# Patient Record
Sex: MALE | Race: WHITE | NOT HISPANIC OR LATINO | Employment: FULL TIME | URBAN - METROPOLITAN AREA
[De-identification: names, ages, dates, MRNs, and addresses within clinical notes are randomized per-mention and may not be internally consistent; named-entity substitution may affect disease eponyms.]

---

## 2018-07-26 ENCOUNTER — HOSPITAL ENCOUNTER (EMERGENCY)
Facility: HOSPITAL | Age: 33
Discharge: HOME | End: 2018-07-26
Attending: EMERGENCY MEDICINE

## 2018-07-26 VITALS
DIASTOLIC BLOOD PRESSURE: 71 MMHG | SYSTOLIC BLOOD PRESSURE: 119 MMHG | OXYGEN SATURATION: 98 % | HEART RATE: 65 BPM | TEMPERATURE: 99 F | RESPIRATION RATE: 18 BRPM

## 2018-07-26 DIAGNOSIS — M51.26 LUMBAR HERNIATED DISC: ICD-10-CM

## 2018-07-26 DIAGNOSIS — S39.92XA INJURY OF BACK, INITIAL ENCOUNTER: Primary | ICD-10-CM

## 2018-07-26 PROCEDURE — 96372 THER/PROPH/DIAG INJ SC/IM: CPT

## 2018-07-26 PROCEDURE — 63600175 PHARM REV CODE 636 W HCPCS: Performed by: EMERGENCY MEDICINE

## 2018-07-26 PROCEDURE — 99283 EMERGENCY DEPT VISIT LOW MDM: CPT | Mod: 25

## 2018-07-26 RX ORDER — ACETAMINOPHEN AND CODEINE PHOSPHATE 300; 30 MG/1; MG/1
1 TABLET ORAL EVERY 12 HOURS PRN
Qty: 10 TABLET | Refills: 0 | Status: SHIPPED | OUTPATIENT
Start: 2018-07-26 | End: 2018-08-02

## 2018-07-26 RX ORDER — ONDANSETRON 2 MG/ML
4 INJECTION INTRAMUSCULAR; INTRAVENOUS
Status: COMPLETED | OUTPATIENT
Start: 2018-07-26 | End: 2018-07-26

## 2018-07-26 RX ORDER — DIPHENHYDRAMINE HYDROCHLORIDE 50 MG/ML
50 INJECTION INTRAMUSCULAR; INTRAVENOUS ONCE
Status: COMPLETED | OUTPATIENT
Start: 2018-07-26 | End: 2018-07-26

## 2018-07-26 RX ORDER — HYDROMORPHONE HYDROCHLORIDE 1 MG/ML
1 INJECTION, SOLUTION INTRAMUSCULAR; INTRAVENOUS; SUBCUTANEOUS
Status: COMPLETED | OUTPATIENT
Start: 2018-07-26 | End: 2018-07-26

## 2018-07-26 RX ADMIN — Medication 1 MG: at 04:07

## 2018-07-26 RX ADMIN — DIPHENHYDRAMINE HYDROCHLORIDE 50 MG: 50 INJECTION, SOLUTION INTRAMUSCULAR; INTRAVENOUS at 04:07

## 2018-07-26 RX ADMIN — ONDANSETRON 4 MG: 2 INJECTION, SOLUTION INTRAMUSCULAR; INTRAVENOUS at 04:07

## 2018-07-26 NOTE — DISCHARGE INSTRUCTIONS
Please make sure to see your Workers Compensation physician tomorrow for follow-up, please take tomorrow off from work after your injury, please discuss with your physician about physical therapy, occupational therapy, and pain management, we have made you referrals to family medicine and to neurosurgery, if you develop any worsening pain, loss of sensation to your lower extremities, or inability to control your urine or stools, return to the ER immediately for evaluation, please do not drink or operate any vehicles or machinery while on this pain medication

## 2018-07-26 NOTE — ED PROVIDER NOTES
Encounter Date: 7/26/2018    SCRIBE #1 NOTE: ISabina, am scribing for, and in the presence of, . I have scribed the entire note.       History     Chief Complaint   Patient presents with    Back Pain     lumbar back pain that radiates down RLE. pt reports thst he injured his back 1 week ago at [employer], had MRI on 7/24, and f/u with pcp today. PCP gave steroid and toradol shots, and RX for ibuprofen 800mg but pt denies relief.      HPI   This is a 33 y.o. male with a PMHx of HLD, and PSHx of appendectomy, who presents to the Emergency Department with his girlfriend with a cc of lumbar back pain x 1 week.     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 The patient states s/p injury he has moderate, dull, and aching pain to his lower back, constant, worse with movement, and improved with rest, with mild radiation down his RLE, but denies any tawnya UI or stool I, no loss of sensation, no weakness, and no decreased ability to ambulate.    Otherwise, no recent admissions, no hx of IVD use, no procedures, no hx malignancy, no recent hospitalizations, and f/u appointment scheduled with Works Comp MD in 6 days. Was not given Rx for pain, and further was not set-up with PT OT or pain management. MRI showed no signs of chord impingement.     7/24/2018 MRI IMPRESSIONS:    There is disc bulges and facet arthroplasty along the lumbar spine with a broad posterior disc herniation at L5-S1. These changes cause no spinal canal narrowing and no sinificant foraminal narrowing.    Minimal disc bulges along the lower thoracic spine causing no spinal canal narrowing and no foraminal narrowing.    Review of patient's allergies indicates:  No Known Allergies  Past Medical History:   Diagnosis Date     High cholesterol      Past Surgical History:   Procedure Laterality Date    APPENDECTOMY       No family history on file.  Social History   Substance Use Topics    Smoking status: Never Smoker    Smokeless tobacco: Not on file    Alcohol use No     Review of Systems   Review of Systems   Constitutional: Negative for fevers, chills, diaphoresis and fever.   HENT: Negative for congestion, drooling, ear pain, nosebleeds, sore throat and trouble swallowing.    Eyes: Negative for photophobia, pain and discharge.   Respiratory: Negative for chest tightness, shortness of breath and wheezing.    Cardiovascular: Negative for chest pain and palpitations.   Gastrointestinal: Negative for abdominal pain, diarrhea, nausea and vomiting.   Genitourinary: Negative for difficulty urinating, dysuria, flank pain, frequency and hematuria.   Musculoskeletal: See HPI.  Skin: Negative for rash.   Neurological: Negative for dizziness, seizures, syncope and headaches.   Psychiatric/Behavioral: Negative for behavioral problems.       Physical Exam     Initial Vitals [07/26/18 1520]   BP Pulse Resp Temp SpO2   125/76 80 18 97.2 °F (36.2 °C) 98 %      MAP       --         Physical Exam   Constitutional: Pt appears well-developed and well-nourished.    HENT: Mild to moderate distress secondary to back pain.  Head: Normocephalic and atraumatic.   Mouth/Throat: No oropharyngeal exudate.   Eyes: Conjunctivae and EOM are normal. Pupils are equal, round, and reactive to light. No scleral icterus.   Neck: Normal range of motion. Neck supple. No JVD present.   Cardiovascular: Normal rate, regular rhythm and normal heart sounds. Exam reveals no gallop and no friction rub.    No murmur heard.  Pulmonary/Chest: Breath sounds normal. No stridor. No respiratory distress. Pt has no wheezes. Pt has no rhonchi.   Abdominal: Soft. Bowel sounds are normal. Pt exhibits no distension and no mass. There is no tenderness. There is no rebound and no  guarding.   Musculoskeletal: Normal range of motion. Pt exhibits no edema or tenderness.   Lymphadenopathy:     Pt has no cervical adenopathy.   Neurological: Pt is alert and oriented to person, place, and time. Pt has normal strength and normal reflexes. Pt displays normal reflexes. No cranial nerve deficit or sensory deficit. Pt has no saddle anesthesia, normal DTRs, and NO signs of cauda equina  Skin: Skin is warm and dry. Capillary refill takes less than 2 seconds. No rash and no abscess noted. No erythema.         ED Course   Procedures  Labs Reviewed - No data to display       Imaging Results    None                               Clinical Impression:   The primary encounter diagnosis was Injury of back, initial encounter. A diagnosis of Lumbar herniated disc was also pertinent to this visit.       MD NOTE, 4:30pm: Pt stable nad vss, pt presents as noted, unfortunately there is a misconception of ER capabilities, I explained to the patient NSG can only be called for emergent conditions such as cauda equina or ICH, but I can make this referral which I have, further I will make a PCP referral as this was not done, we aggressively treated his pain here with IV narcotics and he is improved, his wife will drive him home, I will write a short course of Tylenol 3 for tonight, but advised he MUST see his MD @ [employer] for pain management as all stronger narcotics have side effects such as dependence and respiratory depression, pt stable on DC, will get off tomorrow from work to make his appointments, referrals as noted, Rx as noted, repeat neuro exam by me shows NO signs of cauda equina, 5/5 motor and sensory, normal DTRs, NO SADDLE ANESTHESIA, and pt agrees entirely with the POC, I also told them:    Please make sure to see your Workers Compensation physician tomorrow for follow-up, please take tomorrow off from work after your injury, please discuss with your physician about physical therapy, occupational  therapy, and pain management, we have made you referrals to family medicine and to neurosurgery, if you develop any worsening pain, loss of sensation to your lower extremities, or inability to control your urine or stools, return to the ER immediately for evaluation, please do not drink or operate any vehicles or machinery while on this pain medication                            Mickie Marcelo MD  07/26/18 6406

## 2018-07-26 NOTE — ED NOTES
Pt presents to the ED w/ c/o of lumbar back pain that radiates down bilateral legs. Pt reports that he injured back 1week ago while at work and had MRI on 7/24. Pt reports saw PCP and was given steroid shot and toradol and rx for ibuprofen 800mg without relief.

## 2020-03-16 ENCOUNTER — OFFICE VISIT (OUTPATIENT)
Dept: URGENT CARE | Facility: CLINIC | Age: 35
End: 2020-03-16
Payer: COMMERCIAL

## 2020-03-16 VITALS
TEMPERATURE: 98 F | BODY MASS INDEX: 32.58 KG/M2 | HEART RATE: 90 BPM | RESPIRATION RATE: 16 BRPM | HEIGHT: 69 IN | WEIGHT: 220 LBS | OXYGEN SATURATION: 97 % | SYSTOLIC BLOOD PRESSURE: 125 MMHG | DIASTOLIC BLOOD PRESSURE: 86 MMHG

## 2020-03-16 DIAGNOSIS — M94.0 COSTOCHONDRITIS: ICD-10-CM

## 2020-03-16 DIAGNOSIS — B34.9 VIRAL ILLNESS: Primary | ICD-10-CM

## 2020-03-16 LAB
CTP QC/QA: YES
FLUAV AG NPH QL: NEGATIVE
FLUBV AG NPH QL: NEGATIVE

## 2020-03-16 PROCEDURE — 87804 POCT INFLUENZA A/B: ICD-10-PCS | Mod: 59,QW,S$GLB, | Performed by: FAMILY MEDICINE

## 2020-03-16 PROCEDURE — 93010 EKG 12-LEAD: ICD-10-PCS | Mod: S$GLB,,, | Performed by: INTERNAL MEDICINE

## 2020-03-16 PROCEDURE — 93005 EKG 12-LEAD: ICD-10-PCS | Mod: S$GLB,,, | Performed by: FAMILY MEDICINE

## 2020-03-16 PROCEDURE — 87804 INFLUENZA ASSAY W/OPTIC: CPT | Mod: QW,S$GLB,, | Performed by: FAMILY MEDICINE

## 2020-03-16 PROCEDURE — 71046 XR CHEST PA AND LATERAL: ICD-10-PCS | Mod: S$GLB,,, | Performed by: RADIOLOGY

## 2020-03-16 PROCEDURE — 99214 OFFICE O/P EST MOD 30 MIN: CPT | Mod: 25,S$GLB,, | Performed by: FAMILY MEDICINE

## 2020-03-16 PROCEDURE — 93005 ELECTROCARDIOGRAM TRACING: CPT | Mod: S$GLB,,, | Performed by: FAMILY MEDICINE

## 2020-03-16 PROCEDURE — 99214 PR OFFICE/OUTPT VISIT, EST, LEVL IV, 30-39 MIN: ICD-10-PCS | Mod: 25,S$GLB,, | Performed by: FAMILY MEDICINE

## 2020-03-16 PROCEDURE — 71046 X-RAY EXAM CHEST 2 VIEWS: CPT | Mod: S$GLB,,, | Performed by: RADIOLOGY

## 2020-03-16 PROCEDURE — 93010 ELECTROCARDIOGRAM REPORT: CPT | Mod: S$GLB,,, | Performed by: INTERNAL MEDICINE

## 2020-03-16 RX ORDER — ALBUTEROL SULFATE 90 UG/1
1-2 AEROSOL, METERED RESPIRATORY (INHALATION)
Qty: 18 G | Refills: 0 | Status: SHIPPED | OUTPATIENT
Start: 2020-03-16 | End: 2021-08-02

## 2020-03-16 NOTE — LETTER
March 16, 2020      [facility] [address]  [facility] [address]  Phone: [phone]  Fax: [phone]       Patient: Micah Pena   YOB: 1985  Date of Visit: 03/16/2020    To Whom It May Concern:    Kral Pena  was at [facility] on 03/16/2020. He may return to work/school on 3/30/20 with no restrictions. If you have any questions or concerns, or if I can be of further assistance, please do not hesitate to contact me.    Sincerely,      Jose Dorsey NP

## 2020-03-16 NOTE — PATIENT INSTRUCTIONS
PLEASE READ YOUR DISCHARGE INSTRUCTIONS ENTIRELY AS IT CONTAINS IMPORTANT INFORMATION.    Please go home and quarantine yourself from 14 days when the symptoms started    Monitor for any worsening symptoms if anything severely worsening please go to the ER    Tylenol and ibuprofen as needed    Drink plenty of fluids rest    Wash hands frequently cover your cough and sneeze    Instructions for Home Care of Patients and Caretakers with Coronavirus Disease 2019     Limit visitors to the home.  Older persons and those that have chronic medical conditions such as diabetes, lung and heart disease are at increased risk for illness.    If possible, patients should use a separate bedroom while recovering. Caregivers and household members should avoid prolonged contact with the patient which means to stay 6 feet away and avoid contact with cough droplets.  When close contact is necessary, wash your hands before and immediately after contact.    Perform hand hygiene frequently. Wash your hands often with soap and water for at least 20 seconds or use an alcohol-based hand , covering all surfaces of your hands and rubbing them together until they feel dry.    Avoid touching your eyes, nose, and mouth with unwashed hands.   Avoid sharing household items with the patient. You should not share dishes, drinking glasses, cups, eating utensils, towels, bedding, or other items. After the patient uses these items, you should wash them thoroughly.   Wash laundry thoroughly.   o Immediately remove and wash clothes or bedding that have blood, stool, or body fluids on them.   Clean all high-touch surfaces, such as counters, tabletops, doorknobs, bathroom fixtures, toilets, phones, keyboards, tablets, and bedside tables, every day.   o Use a household cleaning spray or wipe, according to the label instructions. Labels contain instructions for safe and effective use of the cleaning product including precautions you should  "take when applying the product, such as wearing gloves and making sure you have good ventilation during use of the product.    For more information see CDC link below.      https://www.cdc.gov/coronavirus/2019-ncov/hcp/guidance-prevent-spread.html#precautions               If your symptoms worsen or if you have any other concerns, please contact [facility] at [phone].       You must understand that you have received an Urgent Care treatment only and that you may be released before all of your medical problems are known or treated.      Viral Syndrome (Adult)  A viral illness may cause a number of symptoms. The symptoms depend on the part of the body that the virus affects. If it settles in your nose, throat, and lungs, it may cause cough, sore throat, congestion, and sometimes headache. If it settles in your stomach and intestinal tract, it may cause vomiting and diarrhea. Sometimes it causes vague symptoms like "aching all over," feeling tired, loss of appetite, or fever.  A viral illness usually lasts 1 to 2 weeks, but sometimes it lasts longer. In some cases, a more serious infection can look like a viral syndrome in the first few days of the illness. You may need another exam and additional tests to know the difference. Watch for the warning signs listed below.  Home care  Follow these guidelines for taking care of yourself at home:  · If symptoms are severe, rest at home for the first 2 to 3 days.  · Stay away from cigarette smoke - both your smoke and the smoke from others.  · You may use over-the-counter acetaminophen or ibuprofen for fever, muscle aching, and headache, unless another medicine was prescribed for this. If you have chronic liver or kidney disease or ever had a stomach ulcer or GI bleeding, talk with your doctor before using these medicines. No one who is younger than 18 and ill with a fever should take aspirin. It may cause severe disease or death.  · Your appetite may be poor, so a " light diet is fine. Avoid dehydration by drinking 8 to 12 8-ounce glasses of fluids each day. This may include water; orange juice; lemonade; apple, grape, and cranberry juice; clear fruit drinks; electrolyte replacement and sports drinks; and decaffeinated teas and coffee. If you have been diagnosed with a kidney disease, ask your doctor how much and what types of fluids you should drink to prevent dehydration. If you have kidney disease, drinking too much fluid can cause it build up in the your body and be dangerous to your health.  · Over-the-counter remedies won't shorten the length of the illness but may be helpful for cough, sore throat; and nasal and sinus congestion. Don't use decongestants if you have high blood pressure.  Follow-up care  Follow up with your healthcare provider if you do not improve over the next week.  Call 911  Get emergency medical care if any of the following occur:  · Convulsion  · Feeling weak, dizzy, or like you are going to faint  · Chest pain, shortness of breath, wheezing, or difficulty breathing  When to seek medical advice  Call your healthcare provider right away if any of these occur:  · Cough with lots of colored sputum (mucus) or blood in your sputum  · Chest pain, shortness of breath, wheezing, or difficulty breathing  · Severe headache; face, neck, or ear pain  · Severe, constant pain in the lower right side of your belly (abdominal)  · Continued vomiting (cant keep liquids down)  · Frequent diarrhea (more than 5 times a day); blood (red or black color) or mucus in diarrhea  · Feeling weak, dizzy, or like you are going to faint  · Extreme thirst  · Fever of 100.4°F (38°C) or higher, or as directed by your healthcare provider  Date Last Reviewed: 9/25/2015  © 5714-1774 [employer] [address] [place] [address], [place] [address]. All rights reserved. This information is not intended as a substitute for professional medical care. Always follow your healthcare  professional's instructions.

## 2020-03-16 NOTE — PROGRESS NOTES
"Subjective:       Patient ID: Micah Pena Jr. is a 35 y.o. male.    Vitals:  height is 5' 9" (1.753 m) and weight is 99.8 kg (220 lb). His temperature is 97.9 °F (36.6 °C). His blood pressure is 125/86 and his pulse is 90. His respiration is 16 and oxygen saturation is 97%.     Chief Complaint: Chest Pain and Shortness of Breath    Pt. States he is having midsternal chest pain (tender to palpation) and shortness of breath. Pt. States this started this morning. Pt. Denies fever, chills, body aches, and sore throat. Pt. States he's had a dry cough for the past 3 days. No recent travel -he did have contact with someone on a cruise two weeks ago- no contact with anyone positive for covid 19.       Chest Pain    This is a new problem. The current episode started today. The onset quality is sudden. The problem occurs 2 to 4 times per day. The problem has been unchanged. The pain is present in the substernal region. The pain is at a severity of 8/10. The pain is mild. The quality of the pain is described as pressure, squeezing and tightness. The pain does not radiate. Associated symptoms include a cough and shortness of breath. Pertinent negatives include no abdominal pain, back pain, dizziness, fever, headaches, malaise/fatigue, nausea, near-syncope, palpitations, syncope or vomiting. The cough has no precipitants. The cough is non-productive. Nothing worsens the cough. The pain is aggravated by nothing. He has tried nothing for the symptoms. The treatment provided no relief. Risk factors include male gender.   His past medical history is significant for diabetes, hyperlipidemia and sleep apnea.   Pertinent negatives for past medical history include no cancer, no MI and no recent injury.   His family medical history is significant for heart disease, hypertension and stroke.   Pertinent negatives for family medical history include: no CAD.       Constitution: Negative for chills, fatigue and fever.   HENT: Negative for " trouble swallowing.    Neck: Negative for neck pain.   Cardiovascular: Positive for chest pain. Negative for chest trauma, leg swelling, palpitations and passing out.   Respiratory: Positive for cough and shortness of breath. Negative for sleep apnea.    Gastrointestinal: Negative for abdominal pain, nausea, vomiting and heartburn.   Musculoskeletal: Negative for back pain and pain with walking.   Skin: Negative for rash.   Neurological: Negative for dizziness, light-headedness, passing out and headaches.   Hematologic/Lymphatic: Negative for history of blood clots.   Psychiatric/Behavioral: Negative for nervous/anxious. The patient is not nervous/anxious.        Objective:      Physical Exam   Constitutional: He is oriented to person, place, and time. He appears well-developed and well-nourished. He is cooperative.  Non-toxic appearance. He does not have a sickly appearance. He does not appear ill. No distress.   HENT:   Head: Normocephalic and atraumatic.   Right Ear: Hearing, tympanic membrane, external ear and ear canal normal. No middle ear effusion.   Left Ear: Hearing, tympanic membrane, external ear and ear canal normal.  No middle ear effusion.   Nose: Nose normal. No mucosal edema, rhinorrhea or nasal deformity. No epistaxis. Right sinus exhibits no maxillary sinus tenderness and no frontal sinus tenderness. Left sinus exhibits no maxillary sinus tenderness and no frontal sinus tenderness.   Mouth/Throat: Uvula is midline, oropharynx is clear and moist and mucous membranes are normal. No trismus in the jaw. Normal dentition. No uvula swelling. No oropharyngeal exudate, posterior oropharyngeal edema or posterior oropharyngeal erythema.   Pts Mask was not removed to decrease any transmission of viruses, he does not have a sore throat no muffled voice  No sore throat   Eyes: Conjunctivae and lids are normal. No scleral icterus.   Neck: Trachea normal, full passive range of motion without pain and phonation  normal. Neck supple. No neck rigidity. No edema and no erythema present.   Cardiovascular: Normal rate, regular rhythm, normal heart sounds, intact distal pulses and normal pulses.   Pulmonary/Chest: Effort normal and breath sounds normal. No accessory muscle usage. No tachypnea. No respiratory distress. He has no decreased breath sounds. He has no wheezes. He has no rhonchi. He has no rales. He exhibits tenderness (Midsternal).   No acute distress able speaking clearly complete sentences   Abdominal: Normal appearance.   Musculoskeletal: Normal range of motion. He exhibits no edema or deformity.   Neurological: He is alert and oriented to person, place, and time. He exhibits normal muscle tone. Coordination normal.   Skin: Skin is warm, dry, intact, not diaphoretic and not pale.   Psychiatric: He has a normal mood and affect. His speech is normal and behavior is normal. Judgment and thought content normal. Cognition and memory are normal.   Nursing note and vitals reviewed.        Results for orders placed or performed in visit on 03/16/20   POCT Influenza A/B   Result Value Ref Range    Rapid Influenza A Ag Negative Negative    Rapid Influenza B Ag Negative Negative     Acceptable Yes      X-ray Chest Pa And Lateral    Result Date: 3/16/2020  EXAMINATION: XR CHEST PA AND LATERAL CLINICAL HISTORY: Cough TECHNIQUE: PA and lateral views of the chest were performed. COMPARISON: Chest radiograph 02/08/2018 FINDINGS: No detrimental change.The lungs are clear, with normal appearance of pulmonary vasculature and no pleural effusion or pneumothorax. The cardiac silhouette is normal in size. The hilar and mediastinal contours are unremarkable. Bones are intact.     No detrimental change or radiographic acute intrathoracic process seen.  Specifically, no focal consolidation. Electronically signed by: Brijesh Moscoso MD Date:    03/16/2020 Time:    17:32  EKG: normal EKG, normal sinus rhythm, unchanged from  previous tracings. No ectopy. No ST segment elevation or depression. Rate of 69 bpm. T wave inversion III only. Present on ekg from 2018    Assessment:       1. Viral illness    2. Costochondritis        Plan:         Viral illness  -     POCT Influenza A/B  -     X-Ray Chest PA And Lateral; Future; Expected date: 03/16/2020  -     albuterol (PROVENTIL/VENTOLIN HFA) 90 mcg/actuation inhaler; Inhale 1-2 puffs into the lungs every 4 to 6 hours as needed for Wheezing. Rescue  Dispense: 18 g; Refill: 0    Costochondritis  -     EKG 12-lead     Discussed patient does not meet criteria for covid19 testing however this is the likely diagnosis is needs to go home in quarantine for 14 days go to the ER for any severely worsening symptoms patient agreeable.  Declined cough medicine    Patient Instructions   PLEASE READ YOUR DISCHARGE INSTRUCTIONS ENTIRELY AS IT CONTAINS IMPORTANT INFORMATION.    Please go home and quarantine yourself from 14 days when the symptoms started    Monitor for any worsening symptoms if anything severely worsening please go to the ER    Tylenol and ibuprofen as needed    Drink plenty of fluids rest    Wash hands frequently cover your cough and sneeze    Instructions for Home Care of Patients and Caretakers with Coronavirus Disease 2019     Limit visitors to the home.  Older persons and those that have chronic medical conditions such as diabetes, lung and heart disease are at increased risk for illness.    If possible, patients should use a separate bedroom while recovering. Caregivers and household members should avoid prolonged contact with the patient which means to stay 6 feet away and avoid contact with cough droplets.  When close contact is necessary, wash your hands before and immediately after contact.    Perform hand hygiene frequently. Wash your hands often with soap and water for at least 20 seconds or use an alcohol-based hand , covering all surfaces of your hands and rubbing  "them together until they feel dry.    Avoid touching your eyes, nose, and mouth with unwashed hands.   Avoid sharing household items with the patient. You should not share dishes, drinking glasses, cups, eating utensils, towels, bedding, or other items. After the patient uses these items, you should wash them thoroughly.   Wash laundry thoroughly.   o Immediately remove and wash clothes or bedding that have blood, stool, or body fluids on them.   Clean all high-touch surfaces, such as counters, tabletops, doorknobs, bathroom fixtures, toilets, phones, keyboards, tablets, and bedside tables, every day.   o Use a household cleaning spray or wipe, according to the label instructions. Labels contain instructions for safe and effective use of the cleaning product including precautions you should take when applying the product, such as wearing gloves and making sure you have good ventilation during use of the product.    For more information see CDC link below.      https://www.cdc.gov/coronavirus/2019-ncov/hcp/guidance-prevent-spread.html#precautions               If your symptoms worsen or if you have any other concerns, please contact [facility] at [phone].       You must understand that you have received an Urgent Care treatment only and that you may be released before all of your medical problems are known or treated.      Viral Syndrome (Adult)  A viral illness may cause a number of symptoms. The symptoms depend on the part of the body that the virus affects. If it settles in your nose, throat, and lungs, it may cause cough, sore throat, congestion, and sometimes headache. If it settles in your stomach and intestinal tract, it may cause vomiting and diarrhea. Sometimes it causes vague symptoms like "aching all over," feeling tired, loss of appetite, or fever.  A viral illness usually lasts 1 to 2 weeks, but sometimes it lasts longer. In some cases, a more serious infection can look like a viral " syndrome in the first few days of the illness. You may need another exam and additional tests to know the difference. Watch for the warning signs listed below.  Home care  Follow these guidelines for taking care of yourself at home:  · If symptoms are severe, rest at home for the first 2 to 3 days.  · Stay away from cigarette smoke - both your smoke and the smoke from others.  · You may use over-the-counter acetaminophen or ibuprofen for fever, muscle aching, and headache, unless another medicine was prescribed for this. If you have chronic liver or kidney disease or ever had a stomach ulcer or GI bleeding, talk with your doctor before using these medicines. No one who is younger than 18 and ill with a fever should take aspirin. It may cause severe disease or death.  · Your appetite may be poor, so a light diet is fine. Avoid dehydration by drinking 8 to 12 8-ounce glasses of fluids each day. This may include water; orange juice; lemonade; apple, grape, and cranberry juice; clear fruit drinks; electrolyte replacement and sports drinks; and decaffeinated teas and coffee. If you have been diagnosed with a kidney disease, ask your doctor how much and what types of fluids you should drink to prevent dehydration. If you have kidney disease, drinking too much fluid can cause it build up in the your body and be dangerous to your health.  · Over-the-counter remedies won't shorten the length of the illness but may be helpful for cough, sore throat; and nasal and sinus congestion. Don't use decongestants if you have high blood pressure.  Follow-up care  Follow up with your healthcare provider if you do not improve over the next week.  Call 911  Get emergency medical care if any of the following occur:  · Convulsion  · Feeling weak, dizzy, or like you are going to faint  · Chest pain, shortness of breath, wheezing, or difficulty breathing  When to seek medical advice  Call your healthcare provider right away if any of these  occur:  · Cough with lots of colored sputum (mucus) or blood in your sputum  · Chest pain, shortness of breath, wheezing, or difficulty breathing  · Severe headache; face, neck, or ear pain  · Severe, constant pain in the lower right side of your belly (abdominal)  · Continued vomiting (cant keep liquids down)  · Frequent diarrhea (more than 5 times a day); blood (red or black color) or mucus in diarrhea  · Feeling weak, dizzy, or like you are going to faint  · Extreme thirst  · Fever of 100.4°F (38°C) or higher, or as directed by your healthcare provider  Date Last Reviewed: 9/25/2015  © 7179-9328 [employer]. [address] [place] [address], [place] [address]. All rights reserved. This information is not intended as a substitute for professional medical care. Always follow your healthcare professional's instructions.

## 2021-04-26 ENCOUNTER — PATIENT MESSAGE (OUTPATIENT)
Dept: RESEARCH | Facility: HOSPITAL | Age: 36
End: 2021-04-26

## 2023-06-19 ENCOUNTER — TELEPHONE (OUTPATIENT)
Dept: ORTHOPEDICS | Facility: CLINIC | Age: 38
End: 2023-06-19

## 2023-06-19 ENCOUNTER — OFFICE VISIT (OUTPATIENT)
Dept: ORTHOPEDICS | Facility: CLINIC | Age: 38
End: 2023-06-19
Payer: MEDICAID

## 2023-06-19 VITALS
HEIGHT: 68 IN | DIASTOLIC BLOOD PRESSURE: 82 MMHG | BODY MASS INDEX: 38.21 KG/M2 | SYSTOLIC BLOOD PRESSURE: 122 MMHG | HEART RATE: 86 BPM | WEIGHT: 252.13 LBS

## 2023-06-19 DIAGNOSIS — S83.104A ACUTE TRAUMATIC INTERNAL DERANGEMENT OF RIGHT KNEE, INITIAL ENCOUNTER: ICD-10-CM

## 2023-06-19 PROCEDURE — 3008F PR BODY MASS INDEX (BMI) DOCUMENTED: ICD-10-PCS | Mod: CPTII,,, | Performed by: ORTHOPAEDIC SURGERY

## 2023-06-19 PROCEDURE — 99204 OFFICE O/P NEW MOD 45 MIN: CPT | Mod: S$PBB,,, | Performed by: ORTHOPAEDIC SURGERY

## 2023-06-19 PROCEDURE — 3079F PR MOST RECENT DIASTOLIC BLOOD PRESSURE 80-89 MM HG: ICD-10-PCS | Mod: CPTII,,, | Performed by: ORTHOPAEDIC SURGERY

## 2023-06-19 PROCEDURE — 1159F MED LIST DOCD IN RCRD: CPT | Mod: CPTII,,, | Performed by: ORTHOPAEDIC SURGERY

## 2023-06-19 PROCEDURE — 99999 PR PBB SHADOW E&M-EST. PATIENT-LVL III: CPT | Mod: PBBFAC,,, | Performed by: ORTHOPAEDIC SURGERY

## 2023-06-19 PROCEDURE — 1159F PR MEDICATION LIST DOCUMENTED IN MEDICAL RECORD: ICD-10-PCS | Mod: CPTII,,, | Performed by: ORTHOPAEDIC SURGERY

## 2023-06-19 PROCEDURE — 3079F DIAST BP 80-89 MM HG: CPT | Mod: CPTII,,, | Performed by: ORTHOPAEDIC SURGERY

## 2023-06-19 PROCEDURE — 3008F BODY MASS INDEX DOCD: CPT | Mod: CPTII,,, | Performed by: ORTHOPAEDIC SURGERY

## 2023-06-19 PROCEDURE — 3074F SYST BP LT 130 MM HG: CPT | Mod: CPTII,,, | Performed by: ORTHOPAEDIC SURGERY

## 2023-06-19 PROCEDURE — 99213 OFFICE O/P EST LOW 20 MIN: CPT | Mod: PBBFAC,PN | Performed by: ORTHOPAEDIC SURGERY

## 2023-06-19 PROCEDURE — 3074F PR MOST RECENT SYSTOLIC BLOOD PRESSURE < 130 MM HG: ICD-10-PCS | Mod: CPTII,,, | Performed by: ORTHOPAEDIC SURGERY

## 2023-06-19 PROCEDURE — 99999 PR PBB SHADOW E&M-EST. PATIENT-LVL III: ICD-10-PCS | Mod: PBBFAC,,, | Performed by: ORTHOPAEDIC SURGERY

## 2023-06-19 PROCEDURE — 99204 PR OFFICE/OUTPT VISIT, NEW, LEVL IV, 45-59 MIN: ICD-10-PCS | Mod: S$PBB,,, | Performed by: ORTHOPAEDIC SURGERY

## 2023-06-19 RX ORDER — HYDROXYZINE HYDROCHLORIDE 50 MG/1
50 TABLET, FILM COATED ORAL EVERY 6 HOURS PRN
Status: ON HOLD | COMMUNITY
Start: 2023-03-28 | End: 2023-07-10 | Stop reason: CLARIF

## 2023-06-19 RX ORDER — PROPRANOLOL HYDROCHLORIDE 20 MG/1
20 TABLET ORAL 2 TIMES DAILY
Status: ON HOLD | COMMUNITY
Start: 2023-04-26 | End: 2023-07-10 | Stop reason: CLARIF

## 2023-06-19 RX ORDER — VENLAFAXINE HYDROCHLORIDE 37.5 MG/1
37.5 CAPSULE, EXTENDED RELEASE ORAL
COMMUNITY
Start: 2023-04-26 | End: 2023-07-25

## 2023-06-19 RX ORDER — TRAMADOL HYDROCHLORIDE 50 MG/1
50 TABLET ORAL EVERY 6 HOURS
Qty: 25 TABLET | Refills: 0 | Status: ON HOLD | OUTPATIENT
Start: 2023-06-19 | End: 2023-07-10 | Stop reason: CLARIF

## 2023-06-19 RX ORDER — ARIPIPRAZOLE 15 MG/1
15 TABLET ORAL NIGHTLY
Status: ON HOLD | COMMUNITY
Start: 2023-04-26 | End: 2023-07-10 | Stop reason: CLARIF

## 2023-06-19 NOTE — TELEPHONE ENCOUNTER
Spoke with Miladys from [facility]. Pt Dx for tramadol is knee pain. Per Dr Hoover notes.

## 2023-06-19 NOTE — TELEPHONE ENCOUNTER
----- Message from Phuong Rothman LPN sent at 6/19/2023  1:18 PM CDT -----  Regarding: FW: diagnose code request  Contact: Martita [phone]    ----- Message -----  From: Manish Parrish  Sent: 6/19/2023   1:17 PM CDT  To: Kiko Bojorquez Staff  Subject: diagnose code request                            Martita from [facility] requesting RE: she needs a diagnose code for rx below       traMADoL (ULTRAM) 50 mg tablet    [facility] [address] [place], [place] [address] - [address]  [address] [place] [address]  Phone: [phone] Fax: [phone]

## 2023-06-19 NOTE — TELEPHONE ENCOUNTER
----- Message from Jarad Erickson sent at 6/19/2023 11:04 AM CDT -----  Consult/Advisory    Name Of Caller:Micah       Contact Preference:[phone]    Nature of call: Pt called regarding a note that he needs to bring to his job stating light duty and when he can return. Please call pt to further assist

## 2023-06-19 NOTE — TELEPHONE ENCOUNTER
Spoke with pt. Pt states he needs a letter for light duty. Letter written. All questions answered. Pt verbalized understanding.

## 2023-06-19 NOTE — PROGRESS NOTES
Patient ID: Micah Pena Jr. is a 38 y.o. male    Chief Complaint:   Chief Complaint   Patient presents with    Right Knee - Pain       History of Present Illness:    Pleasant 38-year-old male here for evaluation of right knee pain.  He states a few days ago he had a slip and fall at his house.  He felt a pop in his right knee.  He had immediate pain and difficulty weight-bearing.  Was seen at an emergency department and placed a knee immobilizer.  He is ambulating currently but does have subjective instability and mechanical symptoms.  He has a large joint effusion.  No prior knee pain.  Works in the oil industry.    PAST MEDICAL HISTORY:   Past Medical History:   Diagnosis Date    High cholesterol     Lumbar herniated disc      PAST SURGICAL HISTORY:   Past Surgical History:   Procedure Laterality Date    APPENDECTOMY       FAMILY HISTORY:   Family History   Problem Relation Age of Onset    Stroke Father      SOCIAL HISTORY:   Social History     Occupational History    Occupation: [employer]   Tobacco Use    Smoking status: Never    Smokeless tobacco: Never   Substance and Sexual Activity    Alcohol use: No    Drug use: Never    Sexual activity: Yes     Partners: Female     Birth control/protection: None        MEDICATIONS:   Current Outpatient Medications:     ARIPiprazole (ABILIFY) 15 MG Tab, Take 15 mg by mouth every evening., Disp: , Rfl:     hydrOXYzine (ATARAX) 50 MG tablet, Take 50 mg by mouth every 6 (six) hours as needed., Disp: , Rfl:     ibuprofen (ADVIL,MOTRIN) 800 MG tablet, Take 1 tablet (800 mg total) by mouth 3 (three) times daily. for 10 days, Disp: 30 tablet, Rfl: 0    propranoloL (INDERAL) 20 MG tablet, Take 20 mg by mouth 2 (two) times daily., Disp: , Rfl:     traMADoL (ULTRAM) 50 mg tablet, Take 1 tablet (50 mg total) by mouth every 6 (six) hours as needed for Pain. The medication you have been prescribed may cause drowsiness and impair your judgement.  Therefore, you should avoid  driving, climbing, using machinery, etc., so as not to increase your risk of injury.  Do NOT drink any alcohol while on this medication(s). It is also addictive., Disp: 10 tablet, Rfl: 0    venlafaxine (EFFEXOR-XR) 37.5 MG 24 hr capsule, Take 37.5 mg by mouth., Disp: , Rfl:     atorvastatin (LIPITOR) 40 MG tablet, atorvastatin 40 mg tablet  TAKE 1 TABLET BY MOUTH ONCE DAILY FOR 90 DAYS, Disp: , Rfl:     metFORMIN (GLUCOPHAGE-XR) 500 MG ER 24hr tablet, Take 500 mg by mouth once daily., Disp: , Rfl:     promethazine-dextromethorphan (PROMETHAZINE-DM) 6.25-15 mg/5 mL Syrp, Take by mouth., Disp: , Rfl:     traZODone (DESYREL) 50 MG tablet, Take 1 tablet (50 mg total) by mouth nightly., Disp: 30 tablet, Rfl: 0  ALLERGIES: Review of patient's allergies indicates:  No Known Allergies      Physical Exam     Vitals:    06/19/23 0851   BP: 122/82   Pulse: 86     Alert and oriented to person, place and time. No acute distress. Well-groomed, not ill appearing. Pupils round and reactive, normal respiratory effort, no audible wheezing.     GENERAL:  A well-developed, well-nourished 38 y.o. male who is alert and       oriented in no acute distress.      Gait: He  walks with a antalgic gait.                   EXTREMITIES:  Examination of lower extremities reveals there is no visible mass or deformity.    Right knee:  ROM     Ligamentously stable to varus/valgus stress.    Anterior and posterior drawers negative.    No pain over pes bursa.    No warmth    No erythema    Effusion Yes    medial, lateral joint line tenderness    Negative Patellofemoral grind/crepitus     Positive medial and lateral Mario    The skin over both lower extremities is normal and unremarkable.  He has a  painless range of motion of the hips and ankles bilaterally.   Sensation is intact in both lower extremities.    There are no motor deficits in the lower extremities bilaterally.   Pedal pulses are palpable distally bilaterally.    He has no calf  tenderness to palpation nor edema.       Imaging:       X-Ray: I have reviewed all pertinent results/findings and my personal findings are:  Normal x-ray.  No evidence of fracture or dislocation.  Joint effusion.      Assessment & Plan    Acute traumatic internal derangement of right knee, initial encounter  -     Ambulatory referral/consult to Orthopedics         Treatment options were discussed with the patient in detail. We discussed the patient's current imaging as well as differential diagnosis in detail. Based on the patient's symptoms of mechanical symptoms, instability , and traumatic injury, I do believe an MRI of the Right Knee is indicated to rule out meniscus and/or ACL tear, chondral injury and damage to intra-articular structures    The patient will follow-up after MRI to discuss results and further treatment options. They will call the clinic with any questions or concerns.     Follow up: for MRI/CT Results

## 2023-06-20 ENCOUNTER — PATIENT MESSAGE (OUTPATIENT)
Dept: ORTHOPEDICS | Facility: CLINIC | Age: 38
End: 2023-06-20
Payer: MEDICAID

## 2023-06-29 ENCOUNTER — OFFICE VISIT (OUTPATIENT)
Dept: ORTHOPEDICS | Facility: CLINIC | Age: 38
End: 2023-06-29
Payer: MEDICAID

## 2023-06-29 ENCOUNTER — PATIENT MESSAGE (OUTPATIENT)
Dept: ORTHOPEDICS | Facility: CLINIC | Age: 38
End: 2023-06-29

## 2023-06-29 VITALS
BODY MASS INDEX: 37.37 KG/M2 | WEIGHT: 246.56 LBS | DIASTOLIC BLOOD PRESSURE: 96 MMHG | HEART RATE: 90 BPM | SYSTOLIC BLOOD PRESSURE: 136 MMHG | HEIGHT: 68 IN

## 2023-06-29 DIAGNOSIS — S83.511D RUPTURE OF ANTERIOR CRUCIATE LIGAMENT OF RIGHT KNEE, SUBSEQUENT ENCOUNTER: Primary | ICD-10-CM

## 2023-06-29 DIAGNOSIS — Z01.818 PRE-OP EXAM: ICD-10-CM

## 2023-06-29 DIAGNOSIS — S83.104A ACUTE TRAUMATIC INTERNAL DERANGEMENT OF RIGHT KNEE, INITIAL ENCOUNTER: ICD-10-CM

## 2023-06-29 DIAGNOSIS — S83.231A COMPLEX TEAR OF MEDIAL MENISCUS OF RIGHT KNEE, UNSPECIFIED WHETHER OLD OR CURRENT TEAR, INITIAL ENCOUNTER: ICD-10-CM

## 2023-06-29 PROCEDURE — 1159F PR MEDICATION LIST DOCUMENTED IN MEDICAL RECORD: ICD-10-PCS | Mod: CPTII,,,

## 2023-06-29 PROCEDURE — 99214 OFFICE O/P EST MOD 30 MIN: CPT | Mod: S$PBB,,,

## 2023-06-29 PROCEDURE — 3080F DIAST BP >= 90 MM HG: CPT | Mod: CPTII,,,

## 2023-06-29 PROCEDURE — 3008F BODY MASS INDEX DOCD: CPT | Mod: CPTII,,,

## 2023-06-29 PROCEDURE — 99214 PR OFFICE/OUTPT VISIT, EST, LEVL IV, 30-39 MIN: ICD-10-PCS | Mod: S$PBB,,,

## 2023-06-29 PROCEDURE — 99215 OFFICE O/P EST HI 40 MIN: CPT | Mod: PBBFAC,PN

## 2023-06-29 PROCEDURE — 99999 PR PBB SHADOW E&M-EST. PATIENT-LVL V: ICD-10-PCS | Mod: PBBFAC,,,

## 2023-06-29 PROCEDURE — 1159F MED LIST DOCD IN RCRD: CPT | Mod: CPTII,,,

## 2023-06-29 PROCEDURE — 3075F SYST BP GE 130 - 139MM HG: CPT | Mod: CPTII,,,

## 2023-06-29 PROCEDURE — 3075F PR MOST RECENT SYSTOLIC BLOOD PRESS GE 130-139MM HG: ICD-10-PCS | Mod: CPTII,,,

## 2023-06-29 PROCEDURE — 3080F PR MOST RECENT DIASTOLIC BLOOD PRESSURE >= 90 MM HG: ICD-10-PCS | Mod: CPTII,,,

## 2023-06-29 PROCEDURE — 99999 PR PBB SHADOW E&M-EST. PATIENT-LVL V: CPT | Mod: PBBFAC,,,

## 2023-06-29 PROCEDURE — 3008F PR BODY MASS INDEX (BMI) DOCUMENTED: ICD-10-PCS | Mod: CPTII,,,

## 2023-06-29 RX ORDER — TRAMADOL HYDROCHLORIDE 50 MG/1
50 TABLET ORAL EVERY 6 HOURS PRN
Qty: 10 TABLET | Refills: 0 | Status: ON HOLD | OUTPATIENT
Start: 2023-06-29 | End: 2023-07-10 | Stop reason: CLARIF

## 2023-06-29 RX ORDER — METHOCARBAMOL 500 MG/1
500 TABLET, FILM COATED ORAL 4 TIMES DAILY
Qty: 40 TABLET | Refills: 0 | Status: SHIPPED | OUTPATIENT
Start: 2023-06-29 | End: 2023-07-10

## 2023-06-29 RX ORDER — DOXEPIN HYDROCHLORIDE 25 MG/1
25 CAPSULE ORAL NIGHTLY
Status: ON HOLD | COMMUNITY
Start: 2023-06-19 | End: 2023-07-10 | Stop reason: CLARIF

## 2023-06-29 RX ORDER — CEFAZOLIN SODIUM 2 G/50ML
2 SOLUTION INTRAVENOUS
Status: CANCELLED | OUTPATIENT
Start: 2023-06-29

## 2023-06-29 RX ORDER — QUETIAPINE FUMARATE 100 MG/1
200 TABLET, FILM COATED ORAL NIGHTLY
COMMUNITY
Start: 2023-06-23 | End: 2023-10-12

## 2023-06-29 NOTE — H&P
Patient ID: Micah Pena Jr. is a 38 y.o. male    Chief Complaint:   Chief Complaint   Patient presents with    Right Knee - Results, Pain       History of Present Illness:    Pleasant 38-year-old male here for evaluation of right knee pain.  He states a few days ago he had a slip and fall at his house.  He felt a pop in his right knee.  He had immediate pain and difficulty weight-bearing.  Was seen at an emergency department and placed a knee immobilizer.  He is ambulating currently but does have subjective instability and mechanical symptoms.  He has a large joint effusion.  No prior knee pain.  Works in the oil industry.    ____________________________________________________________________    Interval history 6/29/2023: Patient returns today for follow up of right knee pain. No changes since previous visit. He is here for MRI results.         PAST MEDICAL HISTORY:   Past Medical History:   Diagnosis Date    High cholesterol     Lumbar herniated disc      PAST SURGICAL HISTORY:   Past Surgical History:   Procedure Laterality Date    APPENDECTOMY       FAMILY HISTORY:   Family History   Problem Relation Age of Onset    Stroke Father      SOCIAL HISTORY:   Social History     Occupational History    Occupation: "[employer]" employee   Tobacco Use    Smoking status: Never    Smokeless tobacco: Never   Substance and Sexual Activity    Alcohol use: No    Drug use: Never    Sexual activity: Yes     Partners: Female     Birth control/protection: None        MEDICATIONS:   Current Outpatient Medications:     ARIPiprazole (ABILIFY) 15 MG Tab, Take 15 mg by mouth every evening., Disp: , Rfl:     doxepin (SINEQUAN) 25 MG capsule, Take 25 mg by mouth every evening., Disp: , Rfl:     hydrOXYzine (ATARAX) 50 MG tablet, Take 50 mg by mouth every 6 (six) hours as needed., Disp: , Rfl:     propranoloL (INDERAL) 20 MG tablet, Take 20 mg by mouth 2 (two) times daily., Disp: , Rfl:     QUEtiapine (SEROQUEL) 100 MG Tab, Take 100 mg by  mouth every evening., Disp: , Rfl:     traMADoL (ULTRAM) 50 mg tablet, Take 1 tablet (50 mg total) by mouth every 6 (six) hours., Disp: 25 tablet, Rfl: 0    venlafaxine (EFFEXOR-XR) 37.5 MG 24 hr capsule, Take 37.5 mg by mouth., Disp: , Rfl:     atorvastatin (LIPITOR) 40 MG tablet, atorvastatin 40 mg tablet  TAKE 1 TABLET BY MOUTH ONCE DAILY FOR 90 DAYS, Disp: , Rfl:     metFORMIN (GLUCOPHAGE-XR) 500 MG ER 24hr tablet, Take 500 mg by mouth once daily., Disp: , Rfl:     methocarbamoL (ROBAXIN) 500 MG Tab, Take 1 tablet (500 mg total) by mouth 4 (four) times daily. for 10 days, Disp: 40 tablet, Rfl: 0    promethazine-dextromethorphan (PROMETHAZINE-DM) 6.25-15 mg/5 mL Syrp, Take by mouth., Disp: , Rfl:     traMADoL (ULTRAM) 50 mg tablet, Take 1 tablet (50 mg total) by mouth every 6 (six) hours as needed for Pain. The medication you have been prescribed may cause drowsiness and impair your judgement.  Therefore, you should avoid driving, climbing, using machinery, etc., so as not to increase your risk of injury.  Do NOT drink any alcohol while on this medication(s). It is also addictive., Disp: 10 tablet, Rfl: 0    traZODone (DESYREL) 50 MG tablet, Take 1 tablet (50 mg total) by mouth nightly., Disp: 30 tablet, Rfl: 0  ALLERGIES: Review of patient's allergies indicates:  No Known Allergies      Physical Exam     Vitals:    06/29/23 1014   BP: (!) 136/96   Pulse: 90     Alert and oriented to person, place and time. No acute distress. Well-groomed, not ill appearing. Pupils round and reactive, normal respiratory effort, no audible wheezing.     GENERAL:  A well-developed, well-nourished 38 y.o. male who is alert and       oriented in no acute distress.      Gait: He  walks with a antalgic gait.                   EXTREMITIES:  Examination of lower extremities reveals there is no visible mass or deformity.    Right knee:  ROM     Ligamentously stable to varus/valgus stress.    Anterior and posterior drawers  negative.    No pain over pes bursa.    No warmth    No erythema    Effusion Yes    medial, lateral joint line tenderness    Negative Patellofemoral grind/crepitus     Positive medial and lateral Mario    The skin over both lower extremities is normal and unremarkable.  He has a  painless range of motion of the hips and ankles bilaterally.   Sensation is intact in both lower extremities.    There are no motor deficits in the lower extremities bilaterally.   Pedal pulses are palpable distally bilaterally.    He has no calf tenderness to palpation nor edema.       Imaging:       X-Ray: I have reviewed all pertinent results/findings and my personal findings are:  Normal x-ray.  No evidence of fracture or dislocation.  Joint effusion.    MRI right knee: complete ACL tear, complex posterior horn medial meniscus, high grade MCL tear, subchondral fracture of lateral femoral condyle, lateral tibial plateau bone marrow contusion.     Assessment & Plan    Rupture of anterior cruciate ligament of right knee, subsequent encounter    Pre-op exam  -     X-Ray Chest 1 View Pre-OP; Future; Expected date: 06/29/2023  -     CBC Auto Differential; Future; Expected date: 06/29/2023  -     EKG 12-lead; Future  -     Basic Metabolic Panel; Future; Expected date: 06/29/2023    Acute traumatic internal derangement of right knee, initial encounter  -     X-Ray Chest 1 View Pre-OP; Future; Expected date: 06/29/2023  -     CBC Auto Differential; Future; Expected date: 06/29/2023  -     EKG 12-lead; Future  -     Basic Metabolic Panel; Future; Expected date: 06/29/2023  -     traMADoL (ULTRAM) 50 mg tablet; Take 1 tablet (50 mg total) by mouth every 6 (six) hours as needed for Pain. The medication you have been prescribed may cause drowsiness and impair your judgement.  Therefore, you should avoid driving, climbing, using machinery, etc., so as not to increase your risk of injury.  Do NOT drink any alcohol while on this medication(s). It is  also addictive.  Dispense: 10 tablet; Refill: 0  -     methocarbamoL (ROBAXIN) 500 MG Tab; Take 1 tablet (500 mg total) by mouth 4 (four) times daily. for 10 days  Dispense: 40 tablet; Refill: 0    Complex tear of medial meniscus of right knee, unspecified whether old or current tear, initial encounter      Patient and I discussed MRI results today which show complete ACL tear, meniscus tear, subchondral fracture of lateral femoral condyle.  We discussed conservative treatment including physical therapy and bracing.  We discussed surgical options in detail including ACL reconstruction and possible meniscectomy versus meniscus repair.  Patient would like to proceed with surgery.  He understands the risks and benefits associated with surgery.    1. Imaging results reviewed today and discussed with Reno Hoover MD. We reviewed with Micah Pena Jr. today, the pathology and natural history of his diagnosis. We have discussed a variety of treatment options including medications, physical therapy and other alternative treatments. I also explained the indications, risks and benefits of surgery. After discussion, he decided to proceed with surgery. The decision was made to go forward with:  Right knee arthroscopy  Right knee ACL reconstruction with allograft  Right knee menisectomy vs mensicus repair  All other indicated procedures     The details of the surgical procedure were explained, including the location of probable incisions and a description of likely hardware and/or grafts to be used. The patient understands the likely convalescence after surgery. Also, we have thoroughly discussed the risks, benefits and alternatives to surgery, including, but not limited to, the risk of infection, joint stiffness, blood clot (including DVT and/or pulmonary embolus), neurologic and vascular injury.  It was explained that, if tissue has been repaired or reconstructed, there is a chance of failure, which may require further  management.    I made the decision to obtain old records of the patient including previous notes and imaging. I independently reviewed and interpreted lab results today as well as prior imaging.     Post-Op Medications to be prescribed:   Percocet 5/325mg Take 1-2 tablets every 4-6 hours PRN pain #28  Zofran 4mg oral disintegrating tablets every 8 hours PRN nausea/vomiting   Motrin 600 mg TID PRN   ASA 81mg twice daily x 2 weeks     Pt begin immediately post op  DME: Debra     Medical Clearance: No  Hx of DVT,PE, anesthetic complications: No     Additional notes/concerns:  None     Opioid Disclosure  Today we discussed the reasons why the prescription is necessary as well as: the risks of addiction and overdose associated with opioid drugs and the dangers of taking opioid drugs with alcohol, benzodiazepines and other central nervous system depressants; alternative treatments that may be available; the risks associated with the use of the drugs being prescribed, specifically that opioids are highly addictive, even when taken as prescribed, that there is a risk of developing a physical or psychological dependence on the controlled dangerous substance, and that the risks of taking more opioids than prescribed or mixing sedatives, benzodiazepines or alcohol with opioids can result in fatal respiratory depression.

## 2023-06-29 NOTE — PROGRESS NOTES
Patient ID: Micah Pena Jr. is a 38 y.o. male    Chief Complaint:   Chief Complaint   Patient presents with    Right Knee - Results, Pain       History of Present Illness:    Pleasant 38-year-old male here for evaluation of right knee pain.  He states a few days ago he had a slip and fall at his house.  He felt a pop in his right knee.  He had immediate pain and difficulty weight-bearing.  Was seen at an emergency department and placed a knee immobilizer.  He is ambulating currently but does have subjective instability and mechanical symptoms.  He has a large joint effusion.  No prior knee pain.  Works in the oil industry.    ____________________________________________________________________    Interval history 6/29/2023: Patient returns today for follow up of right knee pain. No changes since previous visit. He is here for MRI results.         PAST MEDICAL HISTORY:   Past Medical History:   Diagnosis Date    High cholesterol     Lumbar herniated disc      PAST SURGICAL HISTORY:   Past Surgical History:   Procedure Laterality Date    APPENDECTOMY       FAMILY HISTORY:   Family History   Problem Relation Age of Onset    Stroke Father      SOCIAL HISTORY:   Social History     Occupational History    Occupation: [employer] employee   Tobacco Use    Smoking status: Never    Smokeless tobacco: Never   Substance and Sexual Activity    Alcohol use: No    Drug use: Never    Sexual activity: Yes     Partners: Female     Birth control/protection: None        MEDICATIONS:   Current Outpatient Medications:     ARIPiprazole (ABILIFY) 15 MG Tab, Take 15 mg by mouth every evening., Disp: , Rfl:     doxepin (SINEQUAN) 25 MG capsule, Take 25 mg by mouth every evening., Disp: , Rfl:     hydrOXYzine (ATARAX) 50 MG tablet, Take 50 mg by mouth every 6 (six) hours as needed., Disp: , Rfl:     propranoloL (INDERAL) 20 MG tablet, Take 20 mg by mouth 2 (two) times daily., Disp: , Rfl:     QUEtiapine (SEROQUEL) 100 MG Tab, Take 100 mg by  mouth every evening., Disp: , Rfl:     traMADoL (ULTRAM) 50 mg tablet, Take 1 tablet (50 mg total) by mouth every 6 (six) hours., Disp: 25 tablet, Rfl: 0    venlafaxine (EFFEXOR-XR) 37.5 MG 24 hr capsule, Take 37.5 mg by mouth., Disp: , Rfl:     atorvastatin (LIPITOR) 40 MG tablet, atorvastatin 40 mg tablet  TAKE 1 TABLET BY MOUTH ONCE DAILY FOR 90 DAYS, Disp: , Rfl:     metFORMIN (GLUCOPHAGE-XR) 500 MG ER 24hr tablet, Take 500 mg by mouth once daily., Disp: , Rfl:     methocarbamoL (ROBAXIN) 500 MG Tab, Take 1 tablet (500 mg total) by mouth 4 (four) times daily. for 10 days, Disp: 40 tablet, Rfl: 0    promethazine-dextromethorphan (PROMETHAZINE-DM) 6.25-15 mg/5 mL Syrp, Take by mouth., Disp: , Rfl:     traMADoL (ULTRAM) 50 mg tablet, Take 1 tablet (50 mg total) by mouth every 6 (six) hours as needed for Pain. The medication you have been prescribed may cause drowsiness and impair your judgement.  Therefore, you should avoid driving, climbing, using machinery, etc., so as not to increase your risk of injury.  Do NOT drink any alcohol while on this medication(s). It is also addictive., Disp: 10 tablet, Rfl: 0    traZODone (DESYREL) 50 MG tablet, Take 1 tablet (50 mg total) by mouth nightly., Disp: 30 tablet, Rfl: 0  ALLERGIES: Review of patient's allergies indicates:  No Known Allergies      Physical Exam     Vitals:    06/29/23 1014   BP: (!) 136/96   Pulse: 90     Alert and oriented to person, place and time. No acute distress. Well-groomed, not ill appearing. Pupils round and reactive, normal respiratory effort, no audible wheezing.     GENERAL:  A well-developed, well-nourished 38 y.o. male who is alert and       oriented in no acute distress.      Gait: He  walks with a antalgic gait.                   EXTREMITIES:  Examination of lower extremities reveals there is no visible mass or deformity.    Right knee:  ROM     Ligamentously stable to varus/valgus stress.    Anterior and posterior drawers  negative.    No pain over pes bursa.    No warmth    No erythema    Effusion Yes    medial, lateral joint line tenderness    Negative Patellofemoral grind/crepitus     Positive medial and lateral Mario    The skin over both lower extremities is normal and unremarkable.  He has a  painless range of motion of the hips and ankles bilaterally.   Sensation is intact in both lower extremities.    There are no motor deficits in the lower extremities bilaterally.   Pedal pulses are palpable distally bilaterally.    He has no calf tenderness to palpation nor edema.       Imaging:       X-Ray: I have reviewed all pertinent results/findings and my personal findings are:  Normal x-ray.  No evidence of fracture or dislocation.  Joint effusion.    MRI right knee: complete ACL tear, complex posterior horn medial meniscus, high grade MCL tear, subchondral fracture of lateral femoral condyle, lateral tibial plateau bone marrow contusion.     Assessment & Plan    Rupture of anterior cruciate ligament of right knee, subsequent encounter    Pre-op exam  -     X-Ray Chest 1 View Pre-OP; Future; Expected date: 06/29/2023  -     CBC Auto Differential; Future; Expected date: 06/29/2023  -     EKG 12-lead; Future  -     Basic Metabolic Panel; Future; Expected date: 06/29/2023    Acute traumatic internal derangement of right knee, initial encounter  -     X-Ray Chest 1 View Pre-OP; Future; Expected date: 06/29/2023  -     CBC Auto Differential; Future; Expected date: 06/29/2023  -     EKG 12-lead; Future  -     Basic Metabolic Panel; Future; Expected date: 06/29/2023  -     traMADoL (ULTRAM) 50 mg tablet; Take 1 tablet (50 mg total) by mouth every 6 (six) hours as needed for Pain. The medication you have been prescribed may cause drowsiness and impair your judgement.  Therefore, you should avoid driving, climbing, using machinery, etc., so as not to increase your risk of injury.  Do NOT drink any alcohol while on this medication(s). It is  also addictive.  Dispense: 10 tablet; Refill: 0  -     methocarbamoL (ROBAXIN) 500 MG Tab; Take 1 tablet (500 mg total) by mouth 4 (four) times daily. for 10 days  Dispense: 40 tablet; Refill: 0    Complex tear of medial meniscus of right knee, unspecified whether old or current tear, initial encounter      Patient and I discussed MRI results today which show complete ACL tear, meniscus tear, subchondral fracture of lateral femoral condyle.  We discussed conservative treatment including physical therapy and bracing.  We discussed surgical options in detail including ACL reconstruction and possible meniscectomy versus meniscus repair.  Patient would like to proceed with surgery.  He understands the risks and benefits associated with surgery.    1. Imaging results reviewed today and discussed with Reno Hoover MD. We reviewed with Micah Pena Jr. today, the pathology and natural history of his diagnosis. We have discussed a variety of treatment options including medications, physical therapy and other alternative treatments. I also explained the indications, risks and benefits of surgery. After discussion, he decided to proceed with surgery. The decision was made to go forward with:  Right knee arthroscopy  Right knee ACL reconstruction with allograft  Right knee menisectomy vs mensicus repair  All other indicated procedures     The details of the surgical procedure were explained, including the location of probable incisions and a description of likely hardware and/or grafts to be used. The patient understands the likely convalescence after surgery. Also, we have thoroughly discussed the risks, benefits and alternatives to surgery, including, but not limited to, the risk of infection, joint stiffness, blood clot (including DVT and/or pulmonary embolus), neurologic and vascular injury.  It was explained that, if tissue has been repaired or reconstructed, there is a chance of failure, which may require further  management.    I made the decision to obtain old records of the patient including previous notes and imaging. I independently reviewed and interpreted lab results today as well as prior imaging.     Post-Op Medications to be prescribed:   Percocet 5/325mg Take 1-2 tablets every 4-6 hours PRN pain #28  Zofran 4mg oral disintegrating tablets every 8 hours PRN nausea/vomiting   Motrin 600 mg TID PRN   ASA 81mg twice daily x 2 weeks     Pt begin immediately post op  DME: Debra     Medical Clearance: No  Hx of DVT,PE, anesthetic complications: No     Additional notes/concerns:  None     Opioid Disclosure  Today we discussed the reasons why the prescription is necessary as well as: the risks of addiction and overdose associated with opioid drugs and the dangers of taking opioid drugs with alcohol, benzodiazepines and other central nervous system depressants; alternative treatments that may be available; the risks associated with the use of the drugs being prescribed, specifically that opioids are highly addictive, even when taken as prescribed, that there is a risk of developing a physical or psychological dependence on the controlled dangerous substance, and that the risks of taking more opioids than prescribed or mixing sedatives, benzodiazepines or alcohol with opioids can result in fatal respiratory depression.

## 2023-07-06 ENCOUNTER — TELEPHONE (OUTPATIENT)
Dept: ORTHOPEDICS | Facility: CLINIC | Age: 38
End: 2023-07-06
Payer: MEDICAID

## 2023-07-06 NOTE — TELEPHONE ENCOUNTER
Spoke with patient. Informed him 8/10 is available for OR date. Patient accepted and will obtain labs tomorrow.

## 2023-07-10 ENCOUNTER — PATIENT MESSAGE (OUTPATIENT)
Dept: ORTHOPEDICS | Facility: CLINIC | Age: 38
End: 2023-07-10
Payer: MEDICAID

## 2023-07-11 DIAGNOSIS — G89.18 POST-OPERATIVE PAIN: Primary | ICD-10-CM

## 2023-07-11 RX ORDER — OXYCODONE AND ACETAMINOPHEN 5; 325 MG/1; MG/1
1 TABLET ORAL EVERY 4 HOURS PRN
Qty: 28 EACH | Refills: 0 | Status: SHIPPED | OUTPATIENT
Start: 2023-07-11

## 2023-07-14 ENCOUNTER — TELEPHONE (OUTPATIENT)
Dept: ORTHOPEDICS | Facility: CLINIC | Age: 38
End: 2023-07-14
Payer: MEDICAID

## 2023-07-14 NOTE — TELEPHONE ENCOUNTER
----- Message from Sudha Squires sent at 7/14/2023  8:34 AM CDT -----  Regarding: advise; office visit notes and images needed  Contact: Maryann @ [phone] ext [phone]  Maryann Norris RN with [facility] is calling asking to speak with someone in the office. Caller is requesting office notes and images for DOS 7/10/2023. Caller is asking to please fax office notes and images at [phone].    Caller states that she has questions. Caller states that she need to know if there is evidence of DVT, fracture, or infection. Caller is asking to please fax today by 1pm, if possible so that she can submit for review and let the therapist know that it has been approved.     Caller is asking if you have questions, please call her at: [phone] ext [phone]. Thanks.

## 2023-07-25 ENCOUNTER — OFFICE VISIT (OUTPATIENT)
Dept: ORTHOPEDICS | Facility: CLINIC | Age: 38
End: 2023-07-25
Payer: MEDICAID

## 2023-07-25 VITALS
WEIGHT: 241.19 LBS | HEART RATE: 101 BPM | SYSTOLIC BLOOD PRESSURE: 135 MMHG | BODY MASS INDEX: 36.55 KG/M2 | DIASTOLIC BLOOD PRESSURE: 99 MMHG | HEIGHT: 68 IN

## 2023-07-25 DIAGNOSIS — Z98.890 S/P ACL RECONSTRUCTION: Primary | ICD-10-CM

## 2023-07-25 PROCEDURE — 99024 POSTOP FOLLOW-UP VISIT: CPT | Mod: ,,,

## 2023-07-25 PROCEDURE — 3080F DIAST BP >= 90 MM HG: CPT | Mod: CPTII,,,

## 2023-07-25 PROCEDURE — 3008F PR BODY MASS INDEX (BMI) DOCUMENTED: ICD-10-PCS | Mod: CPTII,,,

## 2023-07-25 PROCEDURE — 3080F PR MOST RECENT DIASTOLIC BLOOD PRESSURE >= 90 MM HG: ICD-10-PCS | Mod: CPTII,,,

## 2023-07-25 PROCEDURE — 99999 PR PBB SHADOW E&M-EST. PATIENT-LVL III: ICD-10-PCS | Mod: PBBFAC,,,

## 2023-07-25 PROCEDURE — 1159F PR MEDICATION LIST DOCUMENTED IN MEDICAL RECORD: ICD-10-PCS | Mod: CPTII,,,

## 2023-07-25 PROCEDURE — 3075F PR MOST RECENT SYSTOLIC BLOOD PRESS GE 130-139MM HG: ICD-10-PCS | Mod: CPTII,,,

## 2023-07-25 PROCEDURE — 1159F MED LIST DOCD IN RCRD: CPT | Mod: CPTII,,,

## 2023-07-25 PROCEDURE — 99213 OFFICE O/P EST LOW 20 MIN: CPT | Mod: PBBFAC,PN

## 2023-07-25 PROCEDURE — 99999 PR PBB SHADOW E&M-EST. PATIENT-LVL III: CPT | Mod: PBBFAC,,,

## 2023-07-25 PROCEDURE — 3008F BODY MASS INDEX DOCD: CPT | Mod: CPTII,,,

## 2023-07-25 PROCEDURE — 99024 PR POST-OP FOLLOW-UP VISIT: ICD-10-PCS | Mod: ,,,

## 2023-07-25 PROCEDURE — 3075F SYST BP GE 130 - 139MM HG: CPT | Mod: CPTII,,,

## 2023-07-25 RX ORDER — VENLAFAXINE HYDROCHLORIDE 75 MG/1
75 CAPSULE, EXTENDED RELEASE ORAL
COMMUNITY
Start: 2023-06-23

## 2023-07-25 NOTE — PROGRESS NOTES
Post-op Note    HPI    Micah Pena JrBrian is here 2 weeks s/p the following procedure:     7/10/2023  Arthroscopic assisted right knee ACL reconstruction using allograft  Arthroscopic right knee medial meniscus repair    Overall doing well. Pain controlled on current regimen. He is currently enrolled in Physical Therapy. Does report being 50/50 weight bearing. Denies any chest pain or shortness of breathe. Denies any drainage from the incision. Denies any fevers, chills or paresthesias.     DVT Prophylaxis: asa bid x 2 weeks      Physical Exam:     Patient is alert and oriented no acute distress.   Assistive Device: t scope brace, single crutch    Right knee: sutures removed. Incision(s) are well healed.  There is no evidence of dehiscence.  There is no induration erythema or signs of infection.  Appropriate soft tissue swelling.  Compartments are soft and compressible.  Warm well-perfused extremity. ROM 0-90    Assessment    Micah Pena JrBrian is 2 weeks Post-op     Plan:    Overall doing as expected.  We discussed expectations of surgery and postoperative course.     Pain: Continued postoperative pain regimen -- ibuprofen prn  DVT prophylaxis: completed  PT/OT: Continue/Initiate physical therapy (weight bearing status: may progress to 50/50 until 4 weeks, then 100 4-6 weeks), brace locking in extension when ambulating. ROM 0-90 until 4 weeks then may increase   He was instructed to get right knee xrays     Follow-up: 4 weeks   X-rays next visit: right knee

## 2023-08-10 ENCOUNTER — PATIENT MESSAGE (OUTPATIENT)
Dept: ORTHOPEDICS | Facility: CLINIC | Age: 38
End: 2023-08-10
Payer: MEDICAID

## 2023-08-11 DIAGNOSIS — Z98.890 S/P ACL RECONSTRUCTION: Primary | ICD-10-CM

## 2023-08-14 ENCOUNTER — PATIENT MESSAGE (OUTPATIENT)
Dept: ORTHOPEDICS | Facility: CLINIC | Age: 38
End: 2023-08-14
Payer: MEDICAID

## 2023-08-21 ENCOUNTER — OFFICE VISIT (OUTPATIENT)
Dept: ORTHOPEDICS | Facility: CLINIC | Age: 38
End: 2023-08-21
Payer: MEDICAID

## 2023-08-21 VITALS
DIASTOLIC BLOOD PRESSURE: 98 MMHG | WEIGHT: 243.94 LBS | HEART RATE: 133 BPM | BODY MASS INDEX: 37.09 KG/M2 | SYSTOLIC BLOOD PRESSURE: 135 MMHG

## 2023-08-21 DIAGNOSIS — Z98.890 S/P ACL RECONSTRUCTION: Primary | ICD-10-CM

## 2023-08-21 PROCEDURE — 1159F MED LIST DOCD IN RCRD: CPT | Mod: CPTII,,, | Performed by: ORTHOPAEDIC SURGERY

## 2023-08-21 PROCEDURE — 99024 POSTOP FOLLOW-UP VISIT: CPT | Mod: ,,, | Performed by: ORTHOPAEDIC SURGERY

## 2023-08-21 PROCEDURE — 99999 PR PBB SHADOW E&M-EST. PATIENT-LVL III: CPT | Mod: PBBFAC,,, | Performed by: ORTHOPAEDIC SURGERY

## 2023-08-21 PROCEDURE — 3080F DIAST BP >= 90 MM HG: CPT | Mod: CPTII,,, | Performed by: ORTHOPAEDIC SURGERY

## 2023-08-21 PROCEDURE — 99024 PR POST-OP FOLLOW-UP VISIT: ICD-10-PCS | Mod: ,,, | Performed by: ORTHOPAEDIC SURGERY

## 2023-08-21 PROCEDURE — 3008F BODY MASS INDEX DOCD: CPT | Mod: CPTII,,, | Performed by: ORTHOPAEDIC SURGERY

## 2023-08-21 PROCEDURE — 1159F PR MEDICATION LIST DOCUMENTED IN MEDICAL RECORD: ICD-10-PCS | Mod: CPTII,,, | Performed by: ORTHOPAEDIC SURGERY

## 2023-08-21 PROCEDURE — 3075F SYST BP GE 130 - 139MM HG: CPT | Mod: CPTII,,, | Performed by: ORTHOPAEDIC SURGERY

## 2023-08-21 PROCEDURE — 3075F PR MOST RECENT SYSTOLIC BLOOD PRESS GE 130-139MM HG: ICD-10-PCS | Mod: CPTII,,, | Performed by: ORTHOPAEDIC SURGERY

## 2023-08-21 PROCEDURE — 3008F PR BODY MASS INDEX (BMI) DOCUMENTED: ICD-10-PCS | Mod: CPTII,,, | Performed by: ORTHOPAEDIC SURGERY

## 2023-08-21 PROCEDURE — 99213 OFFICE O/P EST LOW 20 MIN: CPT | Mod: PBBFAC,PN | Performed by: ORTHOPAEDIC SURGERY

## 2023-08-21 PROCEDURE — 3080F PR MOST RECENT DIASTOLIC BLOOD PRESSURE >= 90 MM HG: ICD-10-PCS | Mod: CPTII,,, | Performed by: ORTHOPAEDIC SURGERY

## 2023-08-21 PROCEDURE — 99999 PR PBB SHADOW E&M-EST. PATIENT-LVL III: ICD-10-PCS | Mod: PBBFAC,,, | Performed by: ORTHOPAEDIC SURGERY

## 2023-08-21 RX ORDER — QUETIAPINE FUMARATE 400 MG/1
TABLET, FILM COATED ORAL
COMMUNITY
Start: 2023-08-02

## 2023-08-21 NOTE — PROGRESS NOTES
Post-op Note    HPI    Micah Pena Jr. is here 6 weeks s/p the following procedure:     7/10/2023  Arthroscopic assisted right knee ACL reconstruction using allograft  Arthroscopic right knee medial meniscus repair    Overall doing well. Pain controlled on current regimen.   Doing external PT. Overall doing well in regards to range of motion.  Still has some posterior knee pain but this is slowly getting better.  Not in a brace.  Wants to return to work on light duty      Physical Exam:     Patient is alert and oriented no acute distress.   Assistive Device:  None    Right knee: sutures removed. Incision(s) are well healed.  There is no evidence of dehiscence.  There is no induration erythema or signs of infection.  Appropriate soft tissue swelling.  Compartments are soft and compressible.  Warm well-perfused extremity. ROM 0-120.  No medial joint line tenderness.  Negative Jermaine.  Stable to varus and valgus stress.  Negative Lachman's.  Stable anterior and posterior drawer.    Assessment    Micah Pena Jr. is 6 weeks Post-op     Plan:    Overall doing as expected.  We discussed expectations of surgery and postoperative course.     Pain: Continued postoperative pain regimen -- ibuprofen prn  DVT prophylaxis: completed  PT/OT: Continue/Initiate physical therapy (weight bearing status:   As tolerated)  okay to begin strengthening.  Okay to return to work light duty.  Follow up 6 weeks to discuss return to work full duty as he is anxious to return when able.

## 2023-08-21 NOTE — LETTER
August 21, 2023    Micah Pena Jr.  [address] [facility] [address]         [place] [facility]  [address], [facility] [address] [place] [id number]  Phone: [phone]  Fax: [phone] August 21, 2023     Patient: Micah Pena Jr.   YOB: 1985   Date of Visit: 8/21/2023       To Whom It May Concern:    It is my medical opinion that Micah Pena may return to light duty immediately with the following restrictions: no heavy lifting, no squatting and no climbing .    If you have any questions or concerns, please don't hesitate to call.    Sincerely,        Reno Hoover MD

## 2023-08-30 ENCOUNTER — PATIENT MESSAGE (OUTPATIENT)
Dept: ORTHOPEDICS | Facility: CLINIC | Age: 38
End: 2023-08-30
Payer: MEDICAID

## 2023-08-30 ENCOUNTER — TELEPHONE (OUTPATIENT)
Dept: ORTHOPEDICS | Facility: CLINIC | Age: 38
End: 2023-08-30
Payer: MEDICAID

## 2023-08-30 NOTE — TELEPHONE ENCOUNTER
----- Message from Manish Parrish sent at 8/30/2023  4:12 PM CDT -----  Regarding: progress notes request  Contact: Alberta Pinzon with [employer] requesting progress notes on pt       Fax number : [phone]   Contact:[phone]

## 2023-08-30 NOTE — TELEPHONE ENCOUNTER
Abdominal pain and bloating for 1.5 months with worsening bloating recently. INFORMATION HAS BEEN FAXED OVER TO [facility] [employer]

## 2023-09-01 ENCOUNTER — TELEPHONE (OUTPATIENT)
Dept: ORTHOPEDICS | Facility: CLINIC | Age: 38
End: 2023-09-01
Payer: MEDICAID

## 2023-09-01 NOTE — TELEPHONE ENCOUNTER
----- Message from Liset Shepard sent at 9/1/2023  9:26 AM CDT -----  Regarding: Pt advice  Contact: Padmini [phone]  Padmini/PT Aditya calling to request latest Progress Note. Pls call Fax# [phone]

## 2023-10-12 ENCOUNTER — OFFICE VISIT (OUTPATIENT)
Dept: ORTHOPEDICS | Facility: CLINIC | Age: 38
End: 2023-10-12
Payer: MEDICAID

## 2023-10-12 VITALS
DIASTOLIC BLOOD PRESSURE: 88 MMHG | HEART RATE: 85 BPM | HEIGHT: 68 IN | BODY MASS INDEX: 37.94 KG/M2 | WEIGHT: 250.31 LBS | SYSTOLIC BLOOD PRESSURE: 119 MMHG

## 2023-10-12 DIAGNOSIS — Z98.890 S/P ACL RECONSTRUCTION: Primary | ICD-10-CM

## 2023-10-12 PROCEDURE — 3074F SYST BP LT 130 MM HG: CPT | Mod: CPTII,,,

## 2023-10-12 PROCEDURE — 99024 POSTOP FOLLOW-UP VISIT: CPT | Mod: ,,,

## 2023-10-12 PROCEDURE — 1159F MED LIST DOCD IN RCRD: CPT | Mod: CPTII,,,

## 2023-10-12 PROCEDURE — 3079F DIAST BP 80-89 MM HG: CPT | Mod: CPTII,,,

## 2023-10-12 PROCEDURE — 99213 OFFICE O/P EST LOW 20 MIN: CPT | Mod: PBBFAC,PN

## 2023-10-12 PROCEDURE — 99999 PR PBB SHADOW E&M-EST. PATIENT-LVL III: CPT | Mod: PBBFAC,,,

## 2023-10-12 PROCEDURE — 99999 PR PBB SHADOW E&M-EST. PATIENT-LVL III: ICD-10-PCS | Mod: PBBFAC,,,

## 2023-10-12 PROCEDURE — 1159F PR MEDICATION LIST DOCUMENTED IN MEDICAL RECORD: ICD-10-PCS | Mod: CPTII,,,

## 2023-10-12 PROCEDURE — 3079F PR MOST RECENT DIASTOLIC BLOOD PRESSURE 80-89 MM HG: ICD-10-PCS | Mod: CPTII,,,

## 2023-10-12 PROCEDURE — 99024 PR POST-OP FOLLOW-UP VISIT: ICD-10-PCS | Mod: ,,,

## 2023-10-12 PROCEDURE — 3074F PR MOST RECENT SYSTOLIC BLOOD PRESSURE < 130 MM HG: ICD-10-PCS | Mod: CPTII,,,

## 2023-10-12 RX ORDER — BUSPIRONE HYDROCHLORIDE 15 MG/1
15 TABLET ORAL 2 TIMES DAILY PRN
COMMUNITY
Start: 2023-08-25

## 2023-10-12 NOTE — PROGRESS NOTES
Post-op Note    HPI    Micah Pena Jr. is here 12 weeks s/p the following procedure:     7/10/2023  Arthroscopic assisted right knee ACL reconstruction using allograft  Arthroscopic right knee medial meniscus repair    Overall doing very well.  He is in no pain.  He has been at work doing light duty but is requesting to return to work full duty.  He has been discharged from physical therapy.  No concerns.    Physical Exam:     Patient is alert and oriented no acute distress.   Assistive Device:  None    Right knee: Incision(s) are well healed.  There is no evidence of dehiscence.  There is no induration erythema or signs of infection.  Appropriate soft tissue swelling.  Compartments are soft and compressible.  Warm well-perfused extremity. ROM 0-140.  No medial joint line tenderness.  Negative Jermaine.  Stable to varus and valgus stress.  Negative Lachman's.  Stable anterior and posterior drawer.    Assessment    Micah Pena Jr. is 12 weeks Post-op     Plan:    Overall doing as expected.  We discussed expectations of surgery and postoperative course.     Pain: Continued postoperative pain regimen -- ibuprofen prn  PT/OT: Continue/Initiate physical therapy:  Instructed on importance of continuing home exercise program.  He is able to return to work full duty as tolerated, note given today.  He would like to f/u prn and will contact the clinic with any further questions or concerns.

## 2023-10-12 NOTE — LETTER
October 12, 2023    Micah Pena Jr.  [address] [facility] [address]             [facility]  Orthopedics  Orthopedics  [address]  [facility] [address]  Phone: [phone]  Fax: [phone]   October 12, 2023     Patient: Micah Pena Jr.   YOB: 1985   Date of Visit: 10/12/2023       To Whom it May Concern:    Micah Pena was seen in my clinic on 10/12/2023. He may return to work on 10/13/2023 with no restrictions. He may return to work full duty as tolerated. .    Please excuse him from any classes or work missed.    If you have any questions or concerns, please don't hesitate to call.    Sincerely,         Judy Bojorquez PA-C

## 2023-12-12 ENCOUNTER — TELEPHONE (OUTPATIENT)
Dept: ORTHOPEDICS | Facility: CLINIC | Age: 38
End: 2023-12-12
Payer: MEDICAID

## 2023-12-12 NOTE — TELEPHONE ENCOUNTER
----- Message from Loly Velasquez sent at 12/12/2023  3:17 PM CST -----  Regarding: Fax  Name of Who is Calling:MITA MONTOYA JR. [[id number]]        What is the request in detail: Needs letter stating Dr. Bojorquez released Pt for full duty. Please send fax to Dr. Melo Care [phone]        Can the clinic reply by [facility]:No        What Number to Call Back if not in [facility]: # [phone] to Dr. Melo office

## 2024-05-07 ENCOUNTER — HOSPITAL ENCOUNTER (EMERGENCY)
Facility: HOSPITAL | Age: 39
Discharge: HOME | End: 2024-05-07
Attending: STUDENT IN AN ORGANIZED HEALTH CARE EDUCATION/TRAINING PROGRAM
Payer: MEDICAID

## 2024-05-07 VITALS
BODY MASS INDEX: 36.37 KG/M2 | RESPIRATION RATE: 20 BRPM | HEIGHT: 68 IN | TEMPERATURE: 98 F | WEIGHT: 240 LBS | DIASTOLIC BLOOD PRESSURE: 74 MMHG | OXYGEN SATURATION: 98 % | HEART RATE: 70 BPM | SYSTOLIC BLOOD PRESSURE: 110 MMHG

## 2024-05-07 DIAGNOSIS — N17.9 AKI (ACUTE KIDNEY INJURY): ICD-10-CM

## 2024-05-07 DIAGNOSIS — E86.0 DEHYDRATION: Primary | ICD-10-CM

## 2024-05-07 DIAGNOSIS — K76.0 FATTY LIVER: ICD-10-CM

## 2024-05-07 LAB
ALBUMIN SERPL BCP-MCNC: 5 G/DL (ref 3.5–5.2)
ALP SERPL-CCNC: 77 U/L (ref 38–126)
ALT SERPL W/O P-5'-P-CCNC: 84 U/L (ref 10–44)
ANION GAP SERPL CALC-SCNC: 14 MMOL/L (ref 8–16)
AST SERPL-CCNC: 37 U/L (ref 15–46)
BASOPHILS # BLD AUTO: 0.03 K/UL (ref 0–0.2)
BASOPHILS NFR BLD: 0.3 % (ref 0–1.9)
BILIRUB SERPL-MCNC: 0.8 MG/DL (ref 0.1–1)
CALCIUM SERPL-MCNC: 10.3 MG/DL (ref 8.7–10.5)
CHLORIDE SERPL-SCNC: 101 MMOL/L (ref 95–110)
CK SERPL-CCNC: 102 U/L (ref 55–170)
CO2 SERPL-SCNC: 24 MMOL/L (ref 23–29)
CREAT SERPL-MCNC: 1.76 MG/DL (ref 0.5–1.4)
D DIMER PPP IA.FEU-MCNC: <0.19 MG/L FEU
DIFFERENTIAL METHOD BLD: ABNORMAL
EOSINOPHIL # BLD AUTO: 0.2 K/UL (ref 0–0.5)
EOSINOPHIL NFR BLD: 2 % (ref 0–8)
ERYTHROCYTE [DISTWIDTH] IN BLOOD BY AUTOMATED COUNT: 12.6 % (ref 11.5–14.5)
EST. GFR  (NO RACE VARIABLE): 49.5 ML/MIN/1.73 M^2
GLUCOSE SERPL-MCNC: 125 MG/DL (ref 70–110)
HCT VFR BLD AUTO: 45.7 % (ref 40–54)
HGB BLD-MCNC: 15.2 G/DL (ref 14–18)
IMM GRANULOCYTES # BLD AUTO: 0.04 K/UL (ref 0–0.04)
IMM GRANULOCYTES NFR BLD AUTO: 0.4 % (ref 0–0.5)
LYMPHOCYTES # BLD AUTO: 2.4 K/UL (ref 1–4.8)
LYMPHOCYTES NFR BLD: 25.5 % (ref 18–48)
MCH RBC QN AUTO: 29.3 PG (ref 27–31)
MCHC RBC AUTO-ENTMCNC: 33.3 G/DL (ref 32–36)
MCV RBC AUTO: 88 FL (ref 82–98)
MONOCYTES # BLD AUTO: 0.5 K/UL (ref 0.3–1)
MONOCYTES NFR BLD: 5.2 % (ref 4–15)
NEUTROPHILS # BLD AUTO: 6.1 K/UL (ref 1.8–7.7)
NEUTROPHILS NFR BLD: 66.6 % (ref 38–73)
NRBC BLD-RTO: 0 /100 WBC
PLATELET # BLD AUTO: 344 K/UL (ref 150–450)
PMV BLD AUTO: 8.9 FL (ref 9.2–12.9)
POTASSIUM SERPL-SCNC: 3.9 MMOL/L (ref 3.5–5.1)
PROT SERPL-MCNC: 8.6 G/DL (ref 6–8.4)
RBC # BLD AUTO: 5.18 M/UL (ref 4.6–6.2)
SODIUM SERPL-SCNC: 139 MMOL/L (ref 136–145)
TROPONIN I SERPL-MCNC: <0.012 NG/ML (ref 0.01–0.03)
UUN UR-MCNC: 13 MG/DL (ref 2–20)
WBC # BLD AUTO: 9.21 K/UL (ref 3.9–12.7)

## 2024-05-07 PROCEDURE — 80053 COMPREHEN METABOLIC PANEL: CPT | Mod: ER | Performed by: STUDENT IN AN ORGANIZED HEALTH CARE EDUCATION/TRAINING PROGRAM

## 2024-05-07 PROCEDURE — 85025 COMPLETE CBC W/AUTO DIFF WBC: CPT | Mod: ER | Performed by: STUDENT IN AN ORGANIZED HEALTH CARE EDUCATION/TRAINING PROGRAM

## 2024-05-07 PROCEDURE — 93005 ELECTROCARDIOGRAM TRACING: CPT | Mod: ER

## 2024-05-07 PROCEDURE — 93010 ELECTROCARDIOGRAM REPORT: CPT | Mod: ,,, | Performed by: INTERNAL MEDICINE

## 2024-05-07 PROCEDURE — 96360 HYDRATION IV INFUSION INIT: CPT | Mod: 59,ER

## 2024-05-07 PROCEDURE — 84484 ASSAY OF TROPONIN QUANT: CPT | Mod: ER | Performed by: STUDENT IN AN ORGANIZED HEALTH CARE EDUCATION/TRAINING PROGRAM

## 2024-05-07 PROCEDURE — 94761 N-INVAS EAR/PLS OXIMETRY MLT: CPT | Mod: ER

## 2024-05-07 PROCEDURE — 85379 FIBRIN DEGRADATION QUANT: CPT | Mod: ER | Performed by: STUDENT IN AN ORGANIZED HEALTH CARE EDUCATION/TRAINING PROGRAM

## 2024-05-07 PROCEDURE — 99285 EMERGENCY DEPT VISIT HI MDM: CPT | Mod: 25,ER

## 2024-05-07 PROCEDURE — 25000003 PHARM REV CODE 250: Mod: ER | Performed by: STUDENT IN AN ORGANIZED HEALTH CARE EDUCATION/TRAINING PROGRAM

## 2024-05-07 PROCEDURE — 25500020 PHARM REV CODE 255: Mod: ER | Performed by: STUDENT IN AN ORGANIZED HEALTH CARE EDUCATION/TRAINING PROGRAM

## 2024-05-07 PROCEDURE — 82550 ASSAY OF CK (CPK): CPT | Mod: ER | Performed by: STUDENT IN AN ORGANIZED HEALTH CARE EDUCATION/TRAINING PROGRAM

## 2024-05-07 RX ORDER — METFORMIN HYDROCHLORIDE 500 MG/1
500 TABLET, EXTENDED RELEASE ORAL
COMMUNITY
Start: 2024-03-28

## 2024-05-07 RX ORDER — TRAZODONE HYDROCHLORIDE 150 MG/1
150 TABLET ORAL NIGHTLY PRN
COMMUNITY
Start: 2024-04-04

## 2024-05-07 RX ORDER — SODIUM CHLORIDE 9 MG/ML
1000 INJECTION, SOLUTION INTRAVENOUS
Status: COMPLETED | OUTPATIENT
Start: 2024-05-07 | End: 2024-05-07

## 2024-05-07 RX ORDER — PROPRANOLOL HYDROCHLORIDE 40 MG/1
TABLET ORAL
COMMUNITY
Start: 2024-02-09

## 2024-05-07 RX ORDER — CLONAZEPAM 0.5 MG/1
0.5 TABLET, ORALLY DISINTEGRATING ORAL 2 TIMES DAILY PRN
COMMUNITY
Start: 2024-05-06

## 2024-05-07 RX ORDER — ERGOCALCIFEROL 1.25 MG/1
50000 CAPSULE ORAL
COMMUNITY
Start: 2024-03-28

## 2024-05-07 RX ORDER — METOPROLOL SUCCINATE 25 MG/1
25 TABLET, EXTENDED RELEASE ORAL
COMMUNITY
Start: 2024-03-21

## 2024-05-07 RX ORDER — LISINOPRIL 10 MG/1
10 TABLET ORAL
COMMUNITY
Start: 2024-05-06

## 2024-05-07 RX ADMIN — IOHEXOL 100 ML: 350 INJECTION, SOLUTION INTRAVENOUS at 03:05

## 2024-05-07 RX ADMIN — SODIUM CHLORIDE 1000 ML: 9 INJECTION, SOLUTION INTRAVENOUS at 02:05

## 2024-05-07 NOTE — Clinical Note
"Micah"Rich Pena was seen and treated in our emergency department on 5/7/2024.  He may return to work on 05/09/2024.       If you have any questions or concerns, please don't hesitate to call.      Valerie Franco, DO"

## 2024-05-07 NOTE — ED NOTES
PT presents to the ED with C/O dizziness, mid sternal chest pain, upper back pain that radiates to neck, nausea with no vomiting, and diaphoresis that began 1 hour prior to arrival. Hypotensive upon arrival to ED. All other VSS. AAOx4.

## 2024-05-07 NOTE — ED PROVIDER NOTES
NAME:  Micah Pena Jr.  CSN:     [id number]  MRN:    [id number]  ADMIT DATE: 5/7/2024        eMERGENCY dEPARTMENT eNCOUnter    CHIEF COMPLAINT    Chief Complaint   Patient presents with    Back Pain    Dizziness    Neck Pain     Pt reports about a hour ago, a pain from his back to his neck occurred. PT reports he is dizzy and feels like he is going to pass out       HPI    Micah Pena Jr. is a 39 y.o. male with a past medical history of  has a past medical history of Bipolar disorder, unspecified, Depression, High cholesterol, and Lumbar herniated disc.     he presents to the ED due to lightheadedness and feeling as if he was going to pass out.  Notes that this has been occurring with standing throughout the afternoon.  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  Notes he has had some associated upper back pain that radiates to his neck with these symptoms.  Denies any chest pain or shortness of breath.  Has not passed out.  States his blood pressure is typically high and took 2 blood pressure medications  this morning as prescribed.  No other issues at this time.    HPI       PAST MEDICAL HISTORY  Past Medical History:   Diagnosis Date    Bipolar disorder, unspecified     Depression     High cholesterol     Lumbar herniated disc        SURGICAL HISTORY    Past Surgical History:   Procedure Laterality Date    APPENDECTOMY      RECONSTRUCTION OF ANTERIOR CRUCIATE LIGAMENT USING GRAFT Right 7/10/2023    Procedure: RECONSTRUCTION, KNEE, ACL, USING GRAFT;  Surgeon: Reno Hoover MD;  Location: [facility];  Service: Orthopedics;  Laterality: Right;    RECONSTRUCTION, KNEE, ACL, USING GRAFT  Right 07/10/2023       FAMILY HISTORY    Family History   Problem Relation Name Age of Onset    Stroke Father         SOCIAL HISTORY    Social History     Socioeconomic History    Marital status: Single   Occupational History    Occupation: [employer] employee   Tobacco Use    Smoking status:  "Never    Smokeless tobacco: Never   Substance and Sexual Activity    Alcohol use: No    Drug use: Never    Sexual activity: Yes     Partners: Female     Birth control/protection: None       MEDICATIONS  Current Outpatient Medications   Medication Instructions    busPIRone (BUSPAR) 15 mg, Oral, 2 times daily PRN    clonazePAM (KLONOPIN) 0.5 mg, Oral, 2 times daily PRN    ergocalciferol (ERGOCALCIFEROL) 50,000 Units, Oral, Every 7 days    lisinopriL 10 mg, Oral    metFORMIN (GLUCOPHAGE-XR) 500 mg, Oral    metoprolol succinate (TOPROL-XL) 25 mg, Oral    oxyCODONE-acetaminophen (PERCOCET) 5-325 mg per tablet 1 tablet, Oral, Every 4 hours PRN    propranoloL (INDERAL) 40 MG tablet TAKE 1/2 (ONE-HALF) TABLET BY MOUTH TWICE DAILY AS NEEDED    QUEtiapine (SEROQUEL) 400 MG tablet No dose, route, or frequency recorded.    traZODone (DESYREL) 150 mg, Oral, Nightly PRN    venlafaxine (EFFEXOR-XR) 75 mg, Oral       ALLERGIES    Review of patient's allergies indicates:  No Known Allergies      REVIEW OF SYSTEMS   Review of Systems       PHYSICAL EXAM    Reviewed Triage Note    VITAL SIGNS:   ED Triage Vitals [05/07/24 1433]   Enc Vitals Group      BP (!) 89/56      Pulse 97      Resp 18      Temp 97.8 °F (36.6 °C)      Temp Source Oral      SpO2 96 %      Weight 240 lb      Height 5' 8"      Head Circumference       Peak Flow       Pain Score       Pain Loc       Pain Education       Exclude from Growth Chart        Patient Vitals for the past 24 hrs:   BP Temp Temp src Pulse Resp SpO2 Height Weight   05/07/24 1723 110/74 -- -- 70 -- 98 % -- --   05/07/24 1720 110/74 -- -- -- -- -- -- --   05/07/24 1600 104/64 -- -- 74 20 100 % -- --   05/07/24 1532 -- -- -- -- -- 96 % -- --   05/07/24 1530 (!) 94/55 -- -- 76 -- 95 % -- --   05/07/24 1459 (!) 100/58 -- -- 84 20 96 % -- --   05/07/24 1450 -- -- -- 98 -- 98 % -- --   05/07/24 1433 (!) 89/56 97.8 °F (36.6 °C) Oral 97 18 96 % 5' 8" (1.727 m) 108.9 kg (240 lb)       Physical " Exam    Nursing note and vitals reviewed.  Constitutional: He appears well-developed and well-nourished. He is diaphoretic.   HENT:   Head: Normocephalic and atraumatic.   Eyes: EOM are normal. Pupils are equal, round, and reactive to light.   Neck: Neck supple.   Normal range of motion.  Cardiovascular:  Normal rate, regular rhythm, normal heart sounds and intact distal pulses.           Pulmonary/Chest: Breath sounds normal. No respiratory distress.   Abdominal: Abdomen is soft. There is no abdominal tenderness.   Musculoskeletal:         General: Normal range of motion.      Cervical back: Normal range of motion and neck supple.     Neurological: He is alert and oriented to person, place, and time.   Skin: Skin is warm.   Psychiatric:   Flat affect            EKG     Interpreted by EM physician if performed:               LABS  Pertinent labs reviewed. (See chart for details)   Labs Reviewed   CBC W/ AUTO DIFFERENTIAL - Abnormal; Notable for the following components:       Result Value    MPV 8.9 (*)     All other components within normal limits   COMPREHENSIVE METABOLIC PANEL - Abnormal; Notable for the following components:    Glucose 125 (*)     Creatinine 1.76 (*)     Total Protein 8.6 (*)     ALT 84 (*)     eGFR 49.5 (*)     All other components within normal limits   TROPONIN I   D DIMER, QUANTITATIVE   CK         RADIOLOGY          Imaging Results              CTA Chest Abdomen Pelvis (Final result)  Result time 05/07/24 15:53:06   Procedure changed from CTA Chest Aorta Non Coronary     Final result by Dwayne Sinclair MD (05/07/24 15:53:06)                   Impression:      No CT evidence aortic dissection or other acute abnormality.  There is fatty infiltration involving the liver.      Electronically signed by: Dwayne Sinclair  Date:    05/07/2024  Time:    15:53               Narrative:    EXAMINATION:  CTA CHEST ABDOMEN PELVIS    CLINICAL HISTORY:  Aortic dissection suspected;    TECHNIQUE:  Postcontrast CT  angiogram of the chest abdomen pelvis performed with multiplanar 3D MIP reformations.    COMPARISON:  None    FINDINGS:  No pulmonary nodules, masses or infiltrates.  No pleural fluid or adenopathy involving the chest.  No CT evidence of pulmonary embolism or aortic dissection is identified.  No significant coronary artery calcifications are appreciated.    Fatty liver.  The gallbladder, spleen and pancreas appear normal.  No adrenal masses are demonstrated.  No obstructing kidney stones or hydronephrosis.  No adenopathy or abnormal fluid collections.  No bowel dilatation or acute bowel abnormality is identified.  Minimal aortic and iliac atherosclerosis.  No CT evidence of aneurysmal dilatation or dissection is identified.  No acute bony abnormality is identified.                                       X-Ray Chest AP Portable (Final result)  Result time 05/07/24 15:08:52      Final result by Dwayne Sinclair MD (05/07/24 15:08:52)                   Impression:      No acute findings.      Electronically signed by: Dwayne Sinclair  Date:    05/07/2024  Time:    15:08               Narrative:    EXAMINATION:  XR CHEST AP PORTABLE    CLINICAL HISTORY:  Chest Pain;    TECHNIQUE:  Portable chest x-ray    COMPARISON:  07/07/2023    FINDINGS:  Lung fields are clear.    Heart is normal in size.    No pleural fluid or pneumothorax.    No adenopathy is identified.    No significant osseous abnormality.                                        PROCEDURES    Critical Care    Date/Time: 5/8/2024 7:18 AM    Performed by: Valerie Franco DO  Authorized by: Valerie Franco DO  Direct patient critical care time: 15 minutes  Ordering / reviewing critical care time: 10 minutes  Documentation critical care time: 10 minutes  Total critical care time (exclusive of procedural time) : 35 minutes  Critical care was necessary to treat or prevent imminent or life-threatening deterioration of the following conditions: dehydration.            ED  COURSE & MEDICAL DECISION MAKING    Pertinent Labs & Imaging studies reviewed. (See chart for details and specific orders.)          Summary of review of records:   Follows with primary care.  Does appear that routine screening lab work was done in March of this year.  A1c of 7.2 and hyperlipidemia noted.    Medical Decision Making  Amount and/or Complexity of Data Reviewed  External Data Reviewed: notes.  Labs: ordered. Decision-making details documented in ED Course.  Radiology: ordered and independent interpretation performed. Decision-making details documented in ED Course.  ECG/medicine tests: ordered and independent interpretation performed. Decision-making details documented in ED Course.      Micah Pena Jr. is a 39 y.o. male who presents with lightheadedness and near-syncope while working outside today.  On arrival he was diaphoretic and hypotensive.  States his blood pressure is actually usually high.  Does note some associated discomfort to the upper back into the neck that is new this afternoon.    Differential includes but is not limited to aortic dissection, ALONA, electrolyte derangement, ACS            Medications   0.9%  NaCl infusion (0 mLs Intravenous Stopped 5/7/24 1600)   iohexoL (OMNIPAQUE 350) injection 100 mL (100 mLs Intravenous Given 5/7/24 1546)       ED Course as of 05/08/24 0718   Tue May 07, 2024   1441 EKG 12-lead  Sinus rhythm at a rate of 78.  No ST elevation or depression.  No evidence of ischemia.  Independently interpreted by me. [HL]   1510 CBC auto differential(!)  No acute abnormalities  [HL]   1535 X-Ray Chest AP Portable     No acute findings.   [HL]   1535 Creatinine(!): 1.76  ALONA noted. [HL]   1536 D-Dimer: <0.19 [HL]   1536 Troponin I: <0.012 [HL]   1536 CPK: 102  within normal limits  [HL]      ED Course User Index  [HL] Valerie Franco, DO     Blood pressure was fluid responsive and in the setting of ALONA and working outside all day do feel that he was likely dehydrated.   This was discussed extensively with him at bedside.  Encouraged continued oral hydration and holding blood pressure medications this evening should his blood pressure remained in the low to normal range.  Reasons to return discussed, all questions addressed and encouraged close primary care follow-up.        FINAL IMPRESSION    Final diagnoses:  [E86.0] Dehydration (Primary)  [N17.9] ALONA (acute kidney injury)  [K76.0] Fatty liver       DISPOSITION  Patient discharged in stable condition        ED Prescriptions    None       Follow-up Information       Follow up With Specialties Details Why Contact Info    Your Primary Care Physician  Schedule an appointment as soon as possible for a visit on 5/13/2024      [facility] - Emergency Dept Emergency Medicine  As needed, If symptoms worsen [address] [facility] [address] [phone]              DISCLAIMER: This note was prepared with [employer] voice recognition transcription software. Garbled syntax, mangled pronouns, and other bizarre constructions may be attributed to that software system.             Valerie Franco DO  05/08/24 0718

## 2024-05-12 LAB
OHS QRS DURATION: 88 MS
OHS QTC CALCULATION: 417 MS

## 2025-01-02 ENCOUNTER — HOSPITAL ENCOUNTER (EMERGENCY)
Facility: HOSPITAL | Age: 40
Discharge: HOME | End: 2025-01-02
Attending: EMERGENCY MEDICINE
Payer: MEDICAID

## 2025-01-02 VITALS
WEIGHT: 206 LBS | DIASTOLIC BLOOD PRESSURE: 84 MMHG | SYSTOLIC BLOOD PRESSURE: 133 MMHG | RESPIRATION RATE: 20 BRPM | OXYGEN SATURATION: 97 % | HEIGHT: 68 IN | HEART RATE: 74 BPM | BODY MASS INDEX: 31.22 KG/M2 | TEMPERATURE: 98 F

## 2025-01-02 DIAGNOSIS — S29.9XXA TRAUMA OF CHEST, INITIAL ENCOUNTER: ICD-10-CM

## 2025-01-02 DIAGNOSIS — S20.212A CHEST WALL CONTUSION, LEFT, INITIAL ENCOUNTER: Primary | ICD-10-CM

## 2025-01-02 PROCEDURE — 96372 THER/PROPH/DIAG INJ SC/IM: CPT

## 2025-01-02 PROCEDURE — 93005 ELECTROCARDIOGRAM TRACING: CPT

## 2025-01-02 PROCEDURE — 63600175 PHARM REV CODE 636 W HCPCS

## 2025-01-02 PROCEDURE — 93010 ELECTROCARDIOGRAM REPORT: CPT | Mod: ,,, | Performed by: INTERNAL MEDICINE

## 2025-01-02 PROCEDURE — 25000003 PHARM REV CODE 250

## 2025-01-02 PROCEDURE — 99284 EMERGENCY DEPT VISIT MOD MDM: CPT | Mod: 25

## 2025-01-02 RX ORDER — DICLOFENAC SODIUM 10 MG/G
2 GEL TOPICAL 3 TIMES DAILY PRN
Qty: 50 G | Refills: 0 | Status: SHIPPED | OUTPATIENT
Start: 2025-01-02

## 2025-01-02 RX ORDER — KETOROLAC TROMETHAMINE 30 MG/ML
30 INJECTION, SOLUTION INTRAMUSCULAR; INTRAVENOUS
Status: COMPLETED | OUTPATIENT
Start: 2025-01-02 | End: 2025-01-02

## 2025-01-02 RX ORDER — LIDOCAINE 50 MG/G
1 PATCH TOPICAL
Status: DISCONTINUED | OUTPATIENT
Start: 2025-01-02 | End: 2025-01-02 | Stop reason: HOSPADM

## 2025-01-02 RX ORDER — IBUPROFEN 800 MG/1
800 TABLET ORAL EVERY 6 HOURS PRN
Qty: 20 TABLET | Refills: 0 | Status: SHIPPED | OUTPATIENT
Start: 2025-01-02

## 2025-01-02 RX ORDER — METHOCARBAMOL 500 MG/1
500 TABLET, FILM COATED ORAL 4 TIMES DAILY
Qty: 40 TABLET | Refills: 0 | Status: SHIPPED | OUTPATIENT
Start: 2025-01-02 | End: 2025-01-12

## 2025-01-02 RX ADMIN — LIDOCAINE 1 PATCH: 700 PATCH TOPICAL at 12:01

## 2025-01-02 RX ADMIN — KETOROLAC TROMETHAMINE 30 MG: 30 INJECTION, SOLUTION INTRAMUSCULAR; INTRAVENOUS at 12:01

## 2025-01-02 NOTE — Clinical Note
"Micah Sosa" Becky was seen and treated in our emergency department on 1/2/2025.  He may return to work on 01/05/2025.       If you have any questions or concerns, please don't hesitate to call.      Judy Jennings PA-C"

## 2025-01-02 NOTE — DISCHARGE INSTRUCTIONS
Problem Specific Instructions:  Any bone/joint/muscle injury, regardless of broken bones or not may take between 4-6 weeks to heal.  You may ice it throughout the day, compress it with something like an Ace wrap or compression sleeve and elevate it above your heart regularly to prevent or reduce swelling. If you are not improving in that time, follow-up with your primary care provider or orthopedics for re-evaluation. Return to the Emergency Department if you experience worsening pain, numbness, tingling, change of color in the body part, or any other concerning symptoms.     If you would like to follow up with the [facility] for further care of your fracture, please call the [facility] Scheduling Department at [phone] during business hours. Please let the  know you need a fracture follow-up appointment with Orthopedics, and you will be scheduled in the Orthopedic Clinic. Please bring your original Emergency Department discharge papers and disc with you to the clinic appointment.     If you received or are discharged with pain medicine or muscle relaxers, understand that they can make you sleepy or impair your judgement. Do not make important decisions, drink, drive, swim or perform any other tasks you would not otherwise perform while impaired for at least 24 hours after your last dose.      Ensure you follow up with your Primary Care Provider or any additional providers listed on this discharge sheet. While you may be healthy enough to go home today, I cannot predict the exact course of your diagnoses. It is important to remember that some problems are difficult to diagnose and may not be found during your first visit. As such, it is your responsibility to monitor symptoms, follow-up with another healthcare provider, or return to the emergency room for new or worsening concerns. Unless otherwise instructed, continue all home medications and any new medications prescribed to  you in the Emergency Department.

## 2025-01-02 NOTE — ED PROVIDER NOTES
Encounter Date: 1/2/2025    SCRIBE #1 NOTE: I, Jeanniececilio Bey, am scribing for, and in the presence of,  Juyd Jennings PA-C. I have scribed the following portions of the note - Other sections scribed: HPI, ROS, PE.       History     Chief Complaint   Patient presents with    Shortness of Breath     Pt presents to ER stating that he fell on Tuesday onto a steel rail and hurt his ribs. Pt states it is getting harder for him to breath. Pt rates pain 10/10, took ibuprofen and has no relief. Pt states breathing and pain are getting progressively worse. Pt denies any other issues at this time.      This 39 y.o male with a medical history of Bipolar disorder, High cholesterol, and Lumbar herniated disc presents to the ED c/o left sided rib pain s/p a mechanical fall on 12/31/2024. Pt reports that he tripped and fell onto a rail, hitting his left ribs. He states that he has since been experiencing pain to the area along with associated shortness of breath (exacerbated with deep breathing). He states that the pain is now starting to radiate into his chest and left axilla. He notes taking Ibuprofen for treatment without any improvement. Pt denies fever, cough, hemoptysis, abdominal pain, bruising, or any other associated symptoms.     The history is provided by the patient.     Review of patient's allergies indicates:  No Known Allergies  Past Medical History:   Diagnosis Date    Bipolar disorder, unspecified     Depression     High cholesterol     Lumbar herniated disc      Past Surgical History:   Procedure Laterality Date    APPENDECTOMY      RECONSTRUCTION OF ANTERIOR CRUCIATE LIGAMENT USING GRAFT Right 7/10/2023    Procedure: RECONSTRUCTION, KNEE, ACL, USING GRAFT;  Surgeon: Reno Hoover MD;  Location: [facility] OR;  Service: Orthopedics;  Laterality: Right;    RECONSTRUCTION, KNEE, ACL, USING GRAFT  Right 07/10/2023     Family History   Problem Relation Name Age of Onset    Stroke Father       Social History      Tobacco Use    Smoking status: Never    Smokeless tobacco: Never   Substance Use Topics    Alcohol use: No    Drug use: Never     Review of Systems   Constitutional:  Negative for chills and fever.   HENT:  Negative for congestion, ear pain, rhinorrhea and sore throat.    Eyes:  Negative for redness.   Respiratory:  Positive for shortness of breath. Negative for cough and stridor.         Chest wall pain   Cardiovascular:  Negative for chest pain and palpitations.   Gastrointestinal:  Negative for abdominal pain, constipation, diarrhea, nausea and vomiting.   Genitourinary:  Negative for dysuria, frequency, hematuria and urgency.   Musculoskeletal:  Positive for myalgias. Negative for back pain and neck pain.        (+) left sided rib pain radiating into the chest and left axilla   Skin:  Negative for color change and rash.   Neurological:  Negative for dizziness, speech difficulty, weakness, light-headedness and numbness.        (-) loss of consciousness   Hematological:  Does not bruise/bleed easily.   Psychiatric/Behavioral:  Negative for confusion.        Physical Exam     Initial Vitals [01/02/25 1151]   BP Pulse Resp Temp SpO2   133/84 74 20 98 °F (36.7 °C) 97 %      MAP       --         Physical Exam    Nursing note and vitals reviewed.  Constitutional: He appears well-developed and well-nourished. He is not diaphoretic. No distress.   HENT:   Head: Normocephalic and atraumatic.   Right Ear: Hearing, tympanic membrane and ear canal normal.   Left Ear: Hearing, tympanic membrane and ear canal normal. Mouth/Throat: No oropharyngeal exudate, posterior oropharyngeal edema or posterior oropharyngeal erythema.   Eyes: Conjunctivae are normal.   Neck: Neck supple.   Normal range of motion.  Cardiovascular:  Normal rate, regular rhythm and intact distal pulses.     Exam reveals no gallop and no friction rub.       No murmur heard.  Pulmonary/Chest: Breath sounds normal. No respiratory distress. He has no wheezes.  He has no rhonchi. He has no rales.   Good lung sounds in all fields, no decreased lung sounds.  Point Chest wall tenderness to left ribs.  No bruising noted.  No flail chest.   Abdominal: Abdomen is soft. Bowel sounds are normal. He exhibits no distension. There is no abdominal tenderness. There is no rebound and no guarding.   Musculoskeletal:         General: No edema.      Cervical back: Normal range of motion and neck supple.     Lymphadenopathy:     He has no cervical adenopathy.   Neurological: He is alert and oriented to person, place, and time.   Skin: Skin is warm and dry. No rash noted.   Psychiatric: He has a normal mood and affect.         ED Course   Procedures  Labs Reviewed - No data to display       Imaging Results              XR Ribs Min 4 Views Bilat w/PA Chest (Final result)  Result time 01/02/25 13:28:32      Final result by Aman Kay MD (01/02/25 13:28:32)                   Impression:      1. No acute cardiopulmonary process.  2. No convincing acute displaced rib fracture.      Electronically signed by: Aman Kay MD  Date:    01/02/2025  Time:    13:28               Narrative:    EXAMINATION:  XR RIBS MIN 4 VIEWS BILAT W/PA CHEST    CLINICAL HISTORY:  rib pain;    COMPARISON:  05/07/2024    FINDINGS:  PA chest, 8 views bilateral ribs.    The cardiomediastinal silhouette is not enlarged.  There is no pleural effusion.  The trachea is midline.  The lungs are symmetrically expanded bilaterally with coarse interstitial attenuation accentuated by shallow inspiratory effort and habitus..  No large focal consolidation seen.  There is no pneumothorax.  The osseous structures are remarkable for degenerative change.    The bilateral glenohumeral joints are intact.  The acromioclavicular joints are intact.  No acute displaced rib fracture..                                       Medications   ketorolac injection 30 mg (30 mg Intramuscular Given 1/2/25 1084)     Medical Decision Making  39  yo patient presenting 2/2 fall. Patient with pain predominantly to the left ribs/chest. Hemodynamically stable with a non focal neurological exam. Given exam and history, low suspicion for major traumatic event. No Ct head due to [place] CT head rules and no imaging of C spine due to nexus. Serial abdominal exams without tenderness.  Point chest wall tenderness to the left side rib/chest.  No bruising or abnormality noted.  Stable gait and tolerating po. Patient received xrays to evaluate for dislocation/fracture/other injuries.  X-rays showed no acute cardiopulmonary abnormalities, dislocations or fractures.  Patient received Toradol and lidocaine patch for pain. Patient to be discharged with Robaxin, Voltaren and 800 mg Motrin.  Instructed patient on the importance of not using Voltaren and Motrin together as they are both NSAIDs.  Query likely due to chest wall contusion/muscle strain.  Informed patient that chest wall contusions take time to heal.  Informed him to continue to take deep breaths even though it hurts to help prevent any pneumonia or other pulmonary problems.    Cautious return precautions discussed with patient and/or family with understanding. Prompt f/u with primary care physician discussed. All questions answered. Patient comfortable with plan.         Amount and/or Complexity of Data Reviewed  Radiology:  Decision-making details documented in ED Course.  ECG/medicine tests:  Decision-making details documented in ED Course.    Risk  Prescription drug management.            Scribe Attestation:   Scribe #1: I performed the above scribed service and the documentation accurately describes the services I performed. I attest to the accuracy of the note.        ED Course as of 01/02/25 2200   Thu Jan 02, 2025   1239 EKG 12-lead  Independently interpreted by me: Normal sinus rhythm rate 74, no ST elevation or depression, no T-wave abnormality, no STEMI [MB]   2159 XR Ribs Min 4 Views Bilat w/PA  Chest  No fracture dislocation seen.  No cardiopulmonary processes. [MB]      ED Course User Index  [MB] Judy Jennings PA-C                     I, Judy Jennings PA-C, personally performed the services described in this documentation. All medical record entries made by the scribe were at my direction and in my presence. I have reviewed the chart and agree that the record reflects my personal performance and is accurate and complete.       Clinical Impression:  Final diagnoses:  [S20.212A] Chest wall contusion, left, initial encounter (Primary)  [S29.9XXA] Trauma of chest, initial encounter          ED Disposition Condition    Discharge Stable          ED Prescriptions       Medication Sig Dispense Start Date End Date Auth. Provider    methocarbamoL (ROBAXIN) 500 MG Tab Take 1 tablet (500 mg total) by mouth 4 (four) times daily. for 10 days 40 tablet 1/2/2025 1/12/2025 Judy Jennings PA-C    ibuprofen (ADVIL,MOTRIN) 800 MG tablet Take 1 tablet (800 mg total) by mouth every 6 (six) hours as needed for Pain. 20 tablet 1/2/2025 -- Judy Jennings PA-C    diclofenac sodium (VOLTAREN ARTHRITIS PAIN) 1 % Gel Apply 2 g topically 3 (three) times daily as needed (pain). 50 g 1/2/2025 -- Judy Jennings PA-C          Follow-up Information       Follow up With Specialties Details Why Contact [facility] - Emergency Dept Emergency Medicine Go to  If symptoms worsen, As needed, shortness of breath, chest pain, fever, worsening cough, nausea, vomiting, abdominal pain [address] [facility] [place] [address] [phone]             Judy Jennings PA-C  01/02/25 6742       Judy Jennings PA-C  01/02/25 220

## 2025-01-03 LAB
OHS QRS DURATION: 82 MS
OHS QTC CALCULATION: 415 MS

## 2025-04-01 DIAGNOSIS — S83.231A COMPLEX TEAR OF MEDIAL MENISCUS OF RIGHT KNEE, UNSPECIFIED WHETHER OLD OR CURRENT TEAR, INITIAL ENCOUNTER: Primary | ICD-10-CM

## 2025-04-02 ENCOUNTER — OFFICE VISIT (OUTPATIENT)
Dept: ORTHOPEDICS | Facility: CLINIC | Age: 40
End: 2025-04-02
Payer: COMMERCIAL

## 2025-04-02 VITALS — HEIGHT: 68 IN | WEIGHT: 219.38 LBS | BODY MASS INDEX: 33.25 KG/M2

## 2025-04-02 DIAGNOSIS — S83.104D ACUTE TRAUMATIC INTERNAL DERANGEMENT OF RIGHT KNEE, SUBSEQUENT ENCOUNTER: ICD-10-CM

## 2025-04-02 DIAGNOSIS — Z98.890 S/P ACL RECONSTRUCTION: Primary | ICD-10-CM

## 2025-04-02 PROCEDURE — 99999 PR PBB SHADOW E&M-EST. PATIENT-LVL III: CPT | Mod: PBBFAC,,,

## 2025-04-02 RX ORDER — DEXTROAMPHETAMINE SACCHARATE, AMPHETAMINE ASPARTATE MONOHYDRATE, DEXTROAMPHETAMINE SULFATE AND AMPHETAMINE SULFATE 7.5; 7.5; 7.5; 7.5 MG/1; MG/1; MG/1; MG/1
30 CAPSULE, EXTENDED RELEASE ORAL EVERY MORNING
COMMUNITY

## 2025-04-02 NOTE — PROGRESS NOTES
Post-op Note    HPI    Micah Pena Jr. is here 20 months s/p the following procedure:     7/10/2023  Arthroscopic assisted right knee ACL reconstruction using allograft  Arthroscopic right knee medial meniscus repair    Overall was doing well up until 2 days ago when he was crawling on top of his truck. No pain/pop while it was happening, but pain after. He now has constant pain to medial knee. No instability or mechanical symptoms. Pain 9/10 today.     Physical Exam:     Patient is alert and oriented no acute distress.   Assistive Device: none    Right knee: Incision(s) are well healed.  There is no evidence of dehiscence.  There is no induration erythema or signs of infection.  Appropriate soft tissue swelling.  Compartments are soft and compressible.  Warm well-perfused extremity. IIA Lachman, + guarding. ROM 0-140. + TTP medial joint line. Negative valgus and varus    Imaging:     I have personally reviewed the following imaging and these are an interpretation of my findings:     X-Ray: I have reviewed all pertinent results/findings and my personal findings are:  s/p ACL reconstruction, possible shift of button    Assessment    Micah Pena Jr. is 20 months Post-op     Plan:    Patient returns for acute onset of right knee pain after crawling on Monday.  Button possibly moved and patient is having significant pain.  We will go ahead and repeat MRI of the right knee to evaluate for any retear of ACL or shift in button.  If within normal limits, likely just MCL sprain.  We will contact him after for MRI results    Follow-up: pending MRI  X-rays next visit: none

## 2025-07-09 ENCOUNTER — PATIENT MESSAGE (OUTPATIENT)
Dept: ORTHOPEDICS | Facility: CLINIC | Age: 40
End: 2025-07-09
Payer: COMMERCIAL

## 2025-07-09 ENCOUNTER — OFFICE VISIT (OUTPATIENT)
Dept: FAMILY MEDICINE | Facility: CLINIC | Age: 40
End: 2025-07-09
Payer: COMMERCIAL

## 2025-07-09 DIAGNOSIS — M79.605 LEFT LEG PAIN: Primary | ICD-10-CM

## 2025-07-09 DIAGNOSIS — R20.2 PARESTHESIA: ICD-10-CM

## 2025-07-09 PROCEDURE — 98006 SYNCH AUDIO-VIDEO EST MOD 30: CPT | Mod: 95,,, | Performed by: FAMILY MEDICINE

## 2025-07-09 PROCEDURE — 1159F MED LIST DOCD IN RCRD: CPT | Mod: CPTII,95,, | Performed by: FAMILY MEDICINE

## 2025-07-09 RX ORDER — GABAPENTIN 300 MG/1
300 CAPSULE ORAL
COMMUNITY
Start: 2025-06-19

## 2025-07-09 RX ORDER — ATORVASTATIN CALCIUM 80 MG/1
80 TABLET, FILM COATED ORAL
COMMUNITY
Start: 2025-01-15

## 2025-07-09 RX ORDER — DEXTROAMPHETAMINE SACCHARATE, AMPHETAMINE ASPARTATE MONOHYDRATE, DEXTROAMPHETAMINE SULFATE AND AMPHETAMINE SULFATE 5; 5; 5; 5 MG/1; MG/1; MG/1; MG/1
CAPSULE, EXTENDED RELEASE ORAL
COMMUNITY
Start: 2025-01-30

## 2025-07-09 RX ORDER — CLONIDINE HYDROCHLORIDE 0.1 MG/1
0.1 TABLET ORAL 2 TIMES DAILY
COMMUNITY
Start: 2025-06-23

## 2025-07-09 RX ORDER — SEMAGLUTIDE 0.68 MG/ML
0.5 INJECTION, SOLUTION SUBCUTANEOUS
COMMUNITY
Start: 2025-06-10

## 2025-07-09 RX ORDER — QUETIAPINE FUMARATE 200 MG/1
200 TABLET, FILM COATED ORAL NIGHTLY
COMMUNITY
Start: 2025-01-23

## 2025-07-09 RX ORDER — TRAZODONE HYDROCHLORIDE 50 MG/1
50 TABLET ORAL NIGHTLY PRN
COMMUNITY
Start: 2025-01-23

## 2025-07-09 RX ORDER — VENLAFAXINE HYDROCHLORIDE 150 MG/1
150 CAPSULE, EXTENDED RELEASE ORAL
COMMUNITY
Start: 2025-01-23

## 2025-07-09 RX ORDER — BENZTROPINE MESYLATE 0.5 MG/1
0.5 TABLET ORAL NIGHTLY
COMMUNITY
Start: 2025-06-17

## 2025-07-09 RX ORDER — LAMOTRIGINE 150 MG/1
150 TABLET ORAL
COMMUNITY
Start: 2025-03-26

## 2025-07-09 RX ORDER — NAPROXEN 500 MG/1
500 TABLET ORAL 2 TIMES DAILY PRN
Qty: 60 TABLET | Refills: 0 | Status: SHIPPED | OUTPATIENT
Start: 2025-07-09

## 2025-07-09 NOTE — PROGRESS NOTES
The patient location is:[place]  The chief complaint leading to consultation is: Leg Pain (L lower leg)      Visit type: audiovisual    Face to Face time with patient: 15mins  30 minutes of total time spent on the encounter, which includes face to face time and non-face to face time preparing to see the patient (eg, review of tests), Obtaining and/or reviewing separately obtained history, Documenting clinical information in the electronic or other health record, Independently interpreting results (not separately reported) and communicating results to the patient/family/caregiver, or Care coordination (not separately reported).         Each patient to whom he or she provides medical services by telemedicine is:  (1) informed of the relationship between the physician and patient and the respective role of any other health care provider with respect to management of the patient; and (2) notified that he or she may decline to receive medical services by telemedicine and may withdraw from such care at any time.      Subjective:       Patient ID: Micah Pena Jr. is a 40 y.o. male.    Chief Complaint: Leg Pain (L lower leg)      Leg Pain   The incident occurred more than 1 week ago. There was no injury mechanism. The pain is present in the left leg. The quality of the pain is described as shooting. The pain is moderate. The pain has been Intermittent since onset. Associated symptoms include tingling.       Review of Systems   Constitutional: Negative.    HENT: Negative.     Respiratory: Negative.     Cardiovascular: Negative.    Gastrointestinal: Negative.    Endocrine: Negative.    Genitourinary: Negative.    Musculoskeletal:  Positive for myalgias.   Neurological:  Positive for tingling.   Psychiatric/Behavioral: Negative.            Past Medical History:   Diagnosis Date    Bipolar disorder, unspecified     Depression     High cholesterol     Lumbar herniated disc      Past Surgical History:   Procedure Laterality Date     APPENDECTOMY      RECONSTRUCTION OF ANTERIOR CRUCIATE LIGAMENT USING GRAFT Right 7/10/2023    Procedure: RECONSTRUCTION, KNEE, ACL, USING GRAFT;  Surgeon: Reno Hoover MD;  Location: [facility];  Service: Orthopedics;  Laterality: Right;    RECONSTRUCTION, KNEE, ACL, USING GRAFT  Right 07/10/2023     Family History   Problem Relation Name Age of Onset    Stroke Father       Social History     Socioeconomic History    Marital status: Single   Occupational History    Occupation: [employer] employee   Tobacco Use    Smoking status: Never    Smokeless tobacco: Never   Substance and Sexual Activity    Alcohol use: No    Drug use: Never    Sexual activity: Yes     Partners: Female     Birth control/protection: None     Social Drivers of Health     Financial Resource Strain: Low Risk  (7/9/2025)    Overall Financial Resource Strain (CARDIA)     Difficulty of Paying Living Expenses: Not very hard   Food Insecurity: No Food Insecurity (7/9/2025)    Hunger Vital Sign     Worried About Running Out of Food in the Last Year: Never true     Ran Out of Food in the Last Year: Never true   Transportation Needs: No Transportation Needs (7/9/2025)    PRAPARE - Transportation     Lack of Transportation (Medical): No     Lack of Transportation (Non-Medical): No   Physical Activity: Sufficiently Active (7/9/2025)    Exercise Vital Sign     Days of Exercise per Week: 7 days     Minutes of Exercise per Session: 60 min   Stress: Stress Concern Present (7/9/2025)    [employer] - Occupational Stress Questionnaire     Feeling of Stress : To some extent   Housing Stability: Low Risk  (7/9/2025)    Housing Stability Vital Sign     Unable to Pay for Housing in the Last Year: No     Number of Times Moved in the Last Year: 0     Homeless in the Last Year: No     Current Medications[1]   Objective:      There were no vitals filed for this visit.    Physical Exam  Constitutional:       General: He is not in acute  distress.     Appearance: He is not diaphoretic.   HENT:      Head: Normocephalic and atraumatic.   Eyes:      Conjunctiva/sclera: Conjunctivae normal.   Pulmonary:      Effort: Pulmonary effort is normal.   Musculoskeletal:         General: Normal range of motion.      Cervical back: Neck supple.   Skin:     Findings: No rash.   Neurological:      Mental Status: He is alert and oriented to person, place, and time.   Psychiatric:         Behavior: Behavior normal.         Thought Content: Thought content normal.         Judgment: Judgment normal.            Assessment:       1. Left leg pain    2. Paresthesia        Plan:       Left leg pain  -     naproxen (NAPROSYN) 500 MG tablet; Take 1 tablet (500 mg total) by mouth 2 (two) times daily as needed.  Dispense: 60 tablet; Refill: 0    Paresthesia  -     naproxen (NAPROSYN) 500 MG tablet; Take 1 tablet (500 mg total) by mouth 2 (two) times daily as needed.  Dispense: 60 tablet; Refill: 0  -     Ambulatory referral/consult to Neurology; Future; Expected date: 07/16/2025    Will try nsaids. Placed referral to neuro per pt request.  No future appointments.                Patient note was created using daPulse.  Any errors in syntax or even information may not have been identified and edited on initial review prior to signing this note.         [1]   Current Outpatient Medications:     atorvastatin (LIPITOR) 80 MG tablet, Take 80 mg by mouth., Disp: , Rfl:     benztropine (COGENTIN) 0.5 MG tablet, Take 0.5 mg by mouth every evening., Disp: , Rfl:     cloNIDine (CATAPRES) 0.1 MG tablet, Take 0.1 mg by mouth 2 (two) times daily., Disp: , Rfl:     dextroamphetamine-amphetamine (ADDERALL XR) 20 MG 24 hr capsule, Take by mouth., Disp: , Rfl:     ergocalciferol (ERGOCALCIFEROL) 50,000 unit Cap, Take 50,000 Units by mouth every 7 days., Disp: , Rfl:     gabapentin (NEURONTIN) 300 MG capsule, Take 300 mg by mouth., Disp: , Rfl:     lamoTRIgine (LAMICTAL) 150 MG Tab, Take 150 mg  by mouth., Disp: , Rfl:     OZEMPIC 0.25 mg or 0.5 mg (2 mg/3 mL) pen injector, Inject 0.5 mg into the skin every 7 days., Disp: , Rfl:     QUEtiapine (SEROQUEL) 200 MG Tab, Take 200 mg by mouth every evening., Disp: , Rfl:     QUEtiapine (SEROQUEL) 400 MG tablet, , Disp: , Rfl:     traZODone (DESYREL) 150 MG tablet, Take 150 mg by mouth nightly as needed., Disp: , Rfl:     traZODone (DESYREL) 50 MG tablet, Take 50 mg by mouth nightly as needed., Disp: , Rfl:     venlafaxine (EFFEXOR-XR) 150 MG Cp24, Take 150 mg by mouth., Disp: , Rfl:     dextroamphetamine-amphetamine (ADDERALL XR) 30 MG 24 hr capsule, Take 30 mg by mouth every morning. (Patient not taking: Reported on 7/9/2025), Disp: , Rfl:     metFORMIN (GLUCOPHAGE-XR) 500 MG ER 24hr tablet, Take 500 mg by mouth. (Patient not taking: Reported on 7/9/2025), Disp: , Rfl:     naproxen (NAPROSYN) 500 MG tablet, Take 1 tablet (500 mg total) by mouth 2 (two) times daily as needed., Disp: 60 tablet, Rfl: 0    pantoprazole (PROTONIX) 20 MG tablet, Take 1 tablet (20 mg total) by mouth once daily. for 14 days, Disp: 14 tablet, Rfl: 0    venlafaxine (EFFEXOR-XR) 75 MG 24 hr capsule, Take 75 mg by mouth. (Patient not taking: Reported on 7/9/2025), Disp: , Rfl:

## 2025-07-25 ENCOUNTER — TELEPHONE (OUTPATIENT)
Dept: NEUROLOGY | Facility: CLINIC | Age: 40
End: 2025-07-25
Payer: COMMERCIAL

## 2025-08-06 DIAGNOSIS — R20.2 PARESTHESIA: ICD-10-CM

## 2025-08-06 DIAGNOSIS — M79.605 LEFT LEG PAIN: ICD-10-CM

## 2025-08-06 RX ORDER — NAPROXEN 500 MG/1
500 TABLET ORAL 2 TIMES DAILY PRN
Qty: 60 TABLET | Refills: 0 | Status: SHIPPED | OUTPATIENT
Start: 2025-08-06

## 2025-08-06 NOTE — TELEPHONE ENCOUNTER
Refill Routing Note   Medication(s) are not appropriate for processing by [facility] for the following reason(s):        Outside of protocol  Responsible provider unclear    [facility] action(s):  Route             Appointments  past 12m or future 3m with PCP    Date Provider   Last Visit   7/9/2025 Ivan Marmolejo MD   Next Visit   Visit date not found Ivan Marmolejo MD   ED visits in past 90 days: 0        Note composed:5:45 AM 08/06/2025

## 2025-08-26 ENCOUNTER — HOSPITAL ENCOUNTER (EMERGENCY)
Facility: HOSPITAL | Age: 40
Discharge: HOME | End: 2025-08-26
Attending: EMERGENCY MEDICINE
Payer: COMMERCIAL